# Patient Record
Sex: MALE | Race: WHITE | NOT HISPANIC OR LATINO | Employment: OTHER | URBAN - METROPOLITAN AREA
[De-identification: names, ages, dates, MRNs, and addresses within clinical notes are randomized per-mention and may not be internally consistent; named-entity substitution may affect disease eponyms.]

---

## 2023-05-01 ENCOUNTER — TELEPHONE (OUTPATIENT)
Dept: PRIMARY CARE | Facility: CLINIC | Age: 68
End: 2023-05-01
Payer: MEDICARE

## 2023-05-01 LAB
INR IN PPP BY COAGULATION ASSAY: 4.1 (ref 0.9–1.1)
PROTHROMBIN TIME (PT) IN PPP BY COAGULATION ASSAY: 48.6 SEC (ref 9.8–13.4)

## 2023-05-02 NOTE — TELEPHONE ENCOUNTER
Troy from [facility] is calling to see if we entered the order for skilled nursing and PT for Zeus.

## 2023-05-03 NOTE — TELEPHONE ENCOUNTER
Called and  left message on machine for [facility] [phone] to call.  Per Dr. Parker Abel he would like them to draw patients blood next week.  Order is in the chart.  For a cbc, cmp, lipid, and a hgba1c

## 2023-05-04 ENCOUNTER — TELEPHONE (OUTPATIENT)
Dept: PRIMARY CARE | Facility: CLINIC | Age: 68
End: 2023-05-04
Payer: MEDICARE

## 2023-05-04 NOTE — TELEPHONE ENCOUNTER
Returned a call to gerald and per her request faxed over the lab orders that Dr. Parker Abel requested them draw.  Printed out of the old system and faxed to [phone] per her request

## 2023-05-08 ENCOUNTER — TELEPHONE (OUTPATIENT)
Dept: PRIMARY CARE | Facility: CLINIC | Age: 68
End: 2023-05-08
Payer: MEDICARE

## 2023-05-08 NOTE — TELEPHONE ENCOUNTER
Celeste from [facility] calling with report.    PT eval was done. BP 90/71, Resp 34, HR 65 paced. Dyspnea at rest on 4 liters oxygen. His lasix dose was increased on 5/5.    Education was given to the patient to call the squad if his shortness of breath worsens.     Celeste from [facility] ph: [phone]

## 2023-05-16 ENCOUNTER — APPOINTMENT (OUTPATIENT)
Dept: PRIMARY CARE | Facility: CLINIC | Age: 68
End: 2023-05-16
Payer: MEDICARE

## 2023-05-31 ENCOUNTER — TELEPHONE (OUTPATIENT)
Dept: PRIMARY CARE | Facility: CLINIC | Age: 68
End: 2023-05-31
Payer: MEDICARE

## 2023-05-31 NOTE — TELEPHONE ENCOUNTER
Patient is scheduled for a 6 month FU on 06/06/2023. Does the patient need labs done for that appointment?  I do not see any orders in the system.

## 2023-06-01 DIAGNOSIS — E83.42 HYPOMAGNESEMIA: ICD-10-CM

## 2023-06-01 DIAGNOSIS — E11.22 TYPE 2 DIABETES MELLITUS WITH CHRONIC KIDNEY DISEASE, WITHOUT LONG-TERM CURRENT USE OF INSULIN, UNSPECIFIED CKD STAGE (MULTI): ICD-10-CM

## 2023-06-01 DIAGNOSIS — K21.9 GASTROESOPHAGEAL REFLUX DISEASE WITHOUT ESOPHAGITIS: ICD-10-CM

## 2023-06-01 DIAGNOSIS — E78.00 PURE HYPERCHOLESTEROLEMIA: Primary | ICD-10-CM

## 2023-06-01 PROBLEM — G47.33 OSA ON CPAP: Status: ACTIVE | Noted: 2023-06-01

## 2023-06-01 PROBLEM — E78.5 HYPERLIPIDEMIA: Status: ACTIVE | Noted: 2023-06-01

## 2023-06-01 PROBLEM — R26.2 AMBULATORY DYSFUNCTION: Status: ACTIVE | Noted: 2023-06-01

## 2023-06-01 PROBLEM — N40.0 BPH WITHOUT URINARY OBSTRUCTION: Status: ACTIVE | Noted: 2023-06-01

## 2023-06-01 PROBLEM — I48.0 PAROXYSMAL ATRIAL FIBRILLATION (MULTI): Status: ACTIVE | Noted: 2023-06-01

## 2023-06-01 PROBLEM — I10 HYPERTENSION: Status: ACTIVE | Noted: 2023-06-01

## 2023-06-01 PROBLEM — F32.A ANXIETY AND DEPRESSION: Status: ACTIVE | Noted: 2023-06-01

## 2023-06-01 PROBLEM — E11.9 DIABETES MELLITUS, TYPE II (MULTI): Status: ACTIVE | Noted: 2023-06-01

## 2023-06-01 PROBLEM — N17.9 ACUTE KIDNEY INJURY SUPERIMPOSED ON CHRONIC KIDNEY DISEASE (CMS-HCC): Status: ACTIVE | Noted: 2023-06-01

## 2023-06-01 PROBLEM — G47.61 PERIODIC LIMB MOVEMENTS OF SLEEP: Status: ACTIVE | Noted: 2023-06-01

## 2023-06-01 PROBLEM — F34.1 PRIMARY DYSTHYMIA: Status: ACTIVE | Noted: 2023-06-01

## 2023-06-01 PROBLEM — F41.9 ANXIETY AND DEPRESSION: Status: ACTIVE | Noted: 2023-06-01

## 2023-06-01 PROBLEM — I50.42 CHRONIC COMBINED SYSTOLIC AND DIASTOLIC HEART FAILURE, NYHA CLASS 3 (MULTI): Status: ACTIVE | Noted: 2023-06-01

## 2023-06-01 PROBLEM — N18.9 ACUTE KIDNEY INJURY SUPERIMPOSED ON CHRONIC KIDNEY DISEASE (CMS-HCC): Status: ACTIVE | Noted: 2023-06-01

## 2023-06-01 PROBLEM — J44.9 COPD (CHRONIC OBSTRUCTIVE PULMONARY DISEASE) (MULTI): Status: ACTIVE | Noted: 2023-06-01

## 2023-06-01 PROBLEM — Z95.810 CARDIAC DEFIBRILLATOR IN PLACE: Status: ACTIVE | Noted: 2023-06-01

## 2023-06-01 PROBLEM — J30.9 ALLERGIC RHINITIS: Status: ACTIVE | Noted: 2023-06-01

## 2023-06-01 PROBLEM — I71.21 ANEURYSM OF ASCENDING AORTA (CMS-HCC): Status: ACTIVE | Noted: 2023-06-01

## 2023-06-01 PROBLEM — Z95.2 H/O MECHANICAL AORTIC VALVE REPLACEMENT: Status: ACTIVE | Noted: 2023-06-01

## 2023-06-01 PROBLEM — I44.7 LBBB (LEFT BUNDLE BRANCH BLOCK): Status: ACTIVE | Noted: 2023-06-01

## 2023-06-06 ENCOUNTER — APPOINTMENT (OUTPATIENT)
Dept: PRIMARY CARE | Facility: CLINIC | Age: 68
End: 2023-06-06
Payer: MEDICARE

## 2023-06-06 DIAGNOSIS — E11.9 TYPE 2 DIABETES MELLITUS WITHOUT COMPLICATION, WITHOUT LONG-TERM CURRENT USE OF INSULIN (MULTI): Primary | ICD-10-CM

## 2023-06-06 DIAGNOSIS — E78.00 PURE HYPERCHOLESTEROLEMIA: ICD-10-CM

## 2023-06-06 DIAGNOSIS — F32.A ANXIETY AND DEPRESSION: ICD-10-CM

## 2023-06-06 DIAGNOSIS — K21.9 GASTROESOPHAGEAL REFLUX DISEASE WITHOUT ESOPHAGITIS: ICD-10-CM

## 2023-06-06 DIAGNOSIS — F41.9 ANXIETY AND DEPRESSION: ICD-10-CM

## 2023-06-06 RX ORDER — SERTRALINE HYDROCHLORIDE 50 MG/1
50 TABLET, FILM COATED ORAL DAILY
Qty: 90 TABLET | Refills: 0 | Status: SHIPPED | OUTPATIENT
Start: 2023-06-06 | End: 2024-02-05

## 2023-06-06 RX ORDER — SERTRALINE HYDROCHLORIDE 50 MG/1
50 TABLET, FILM COATED ORAL DAILY
COMMUNITY
End: 2023-06-06 | Stop reason: SDUPTHER

## 2023-06-06 RX ORDER — PANTOPRAZOLE SODIUM 40 MG/1
1 TABLET, DELAYED RELEASE ORAL DAILY
COMMUNITY
Start: 2015-10-26 | End: 2023-06-06 | Stop reason: SDUPTHER

## 2023-06-06 RX ORDER — METFORMIN HYDROCHLORIDE 500 MG/1
500 TABLET ORAL
Qty: 180 TABLET | Refills: 0 | Status: SHIPPED | OUTPATIENT
Start: 2023-06-06 | End: 2023-09-04

## 2023-06-06 RX ORDER — PANTOPRAZOLE SODIUM 40 MG/1
40 TABLET, DELAYED RELEASE ORAL DAILY
Qty: 90 TABLET | Refills: 0 | Status: SHIPPED | OUTPATIENT
Start: 2023-06-06 | End: 2024-02-05

## 2023-06-06 RX ORDER — EZETIMIBE 10 MG/1
10 TABLET ORAL DAILY
Qty: 90 TABLET | Refills: 0 | Status: SHIPPED | OUTPATIENT
Start: 2023-06-06 | End: 2023-09-04

## 2023-06-06 RX ORDER — EZETIMIBE 10 MG/1
10 TABLET ORAL DAILY
COMMUNITY
End: 2023-06-06 | Stop reason: SDUPTHER

## 2023-06-06 RX ORDER — METFORMIN HYDROCHLORIDE 500 MG/1
500 TABLET ORAL
COMMUNITY
End: 2023-06-06 | Stop reason: SDUPTHER

## 2023-06-19 DIAGNOSIS — R53.1 WEAKNESS: Primary | ICD-10-CM

## 2023-07-11 ENCOUNTER — TELEPHONE (OUTPATIENT)
Dept: PRIMARY CARE | Facility: CLINIC | Age: 68
End: 2023-07-11
Payer: MEDICARE

## 2023-07-11 NOTE — TELEPHONE ENCOUNTER
Called and notified Karolyn the Daughter and also called and Left message on machine for Janee from [facility]

## 2023-07-11 NOTE — TELEPHONE ENCOUNTER
Janee from [facility] called in and stated that the patient is declining fast.     The patient is swollen in the upper extremities with a lot of fluid. Lungs are worsening, patient is not able to control his bowels and he is not able to come into the office because he is not mobile.     The patient and his family was offered hospice and this was declined.  Janee did speak with the patient and his wife about palliative care and the family is still thinking about this.  Janee stated that at this rate he maybe has about 2 weeks.

## 2023-07-25 ENCOUNTER — TELEPHONE (OUTPATIENT)
Dept: PRIMARY CARE | Facility: CLINIC | Age: 68
End: 2023-07-25
Payer: MEDICARE

## 2023-07-25 DIAGNOSIS — Q87.89 LOEYS-DIETZ SYNDROME (HHS-HCC): Primary | ICD-10-CM

## 2023-07-25 NOTE — TELEPHONE ENCOUNTER
Celeste, a PT from [facility] the [facility] called in to report that     The patient has no appetite and he is barely eating  The patient is on 4 liters of oxygen and his level is at a 98-99%. Patient wears face mask all the time now  Patient and his wife were told to call the kaye pedraza declined  Hospice services were offered to the patient and that was declined

## 2023-07-26 NOTE — TELEPHONE ENCOUNTER
Janee, a nurse from [facility] called in today and wanted to update you on Zeus.     He is declining rapidly.  He is still refusing ER and hospice  He is still alert but is fully bed ridden and has no energy      Janee mentioned possibly doing DNR paperwork with him, but was not sure how to go about that or if he was even interested in something like that.

## 2023-09-21 PROBLEM — E87.6 HYPOKALEMIA: Status: ACTIVE | Noted: 2023-09-21

## 2023-09-21 PROBLEM — I50.9 CHF EXACERBATION (MULTI): Status: ACTIVE | Noted: 2023-09-21

## 2023-09-21 PROBLEM — R06.02 SHORTNESS OF BREATH AT REST: Status: ACTIVE | Noted: 2023-09-21

## 2023-09-21 PROBLEM — R09.02 HYPOXIA: Status: ACTIVE | Noted: 2023-09-21

## 2023-09-21 PROBLEM — R53.83 FATIGUE: Status: ACTIVE | Noted: 2023-09-21

## 2023-09-21 PROBLEM — I49.3 VENTRICULAR ECTOPY: Status: ACTIVE | Noted: 2023-09-21

## 2023-09-21 PROBLEM — R31.9 HEMATURIA: Status: ACTIVE | Noted: 2023-09-21

## 2023-09-21 PROBLEM — R29.898 COMPLAINTS OF LEG WEAKNESS: Status: ACTIVE | Noted: 2023-09-21

## 2023-09-21 PROBLEM — G72.0 STATIN MYOPATHY: Status: ACTIVE | Noted: 2023-09-21

## 2023-09-21 PROBLEM — R31.29 MICROSCOPIC HEMATURIA: Status: ACTIVE | Noted: 2023-09-21

## 2023-09-21 PROBLEM — F17.200 NICOTINE DEPENDENCE: Status: ACTIVE | Noted: 2023-09-21

## 2023-09-21 PROBLEM — E66.9 OBESITY: Status: ACTIVE | Noted: 2023-09-21

## 2023-09-21 PROBLEM — M79.10 MYALGIA: Status: ACTIVE | Noted: 2023-09-21

## 2023-09-21 PROBLEM — T46.6X5A STATIN MYOPATHY: Status: ACTIVE | Noted: 2023-09-21

## 2023-09-21 RX ORDER — IPRATROPIUM BROMIDE AND ALBUTEROL SULFATE 2.5; .5 MG/3ML; MG/3ML
3 SOLUTION RESPIRATORY (INHALATION) 4 TIMES DAILY
COMMUNITY
Start: 2022-05-20

## 2023-09-21 RX ORDER — EPINEPHRINE 0.22MG
100 AEROSOL WITH ADAPTER (ML) INHALATION
COMMUNITY

## 2023-09-21 RX ORDER — ACETAMINOPHEN 500 MG/1
500 CAPSULE, LIQUID FILLED ORAL EVERY 6 HOURS PRN
COMMUNITY

## 2023-09-21 RX ORDER — ASCORBIC ACID 125 MG
125 TABLET,CHEWABLE ORAL 3 TIMES DAILY
COMMUNITY
Start: 2022-02-15

## 2023-09-21 RX ORDER — WARFARIN 2 MG/1
2 TABLET ORAL
COMMUNITY
Start: 2023-07-24 | End: 2023-11-30 | Stop reason: SDUPTHER

## 2023-09-21 RX ORDER — FLUTICASONE FUROATE, UMECLIDINIUM BROMIDE AND VILANTEROL TRIFENATATE 100; 62.5; 25 UG/1; UG/1; UG/1
1 POWDER RESPIRATORY (INHALATION) DAILY
COMMUNITY
End: 2023-11-30 | Stop reason: SDUPTHER

## 2023-09-21 RX ORDER — TALC
3 POWDER (GRAM) TOPICAL
COMMUNITY
Start: 2021-04-15

## 2023-09-21 RX ORDER — SPIRONOLACTONE 25 MG/1
25 TABLET ORAL DAILY
COMMUNITY
End: 2023-11-13

## 2023-09-21 RX ORDER — QUINIDINE SULFATE 200 MG
1 TABLET ORAL DAILY
COMMUNITY
Start: 2021-09-24

## 2023-09-21 RX ORDER — CARVEDILOL 6.25 MG/1
6.25 TABLET ORAL
COMMUNITY

## 2023-09-21 RX ORDER — TORSEMIDE 20 MG/1
20 TABLET ORAL
COMMUNITY
End: 2023-11-13

## 2023-09-21 RX ORDER — DAPAGLIFLOZIN 10 MG/1
10 TABLET, FILM COATED ORAL
COMMUNITY
End: 2023-11-13

## 2023-09-21 RX ORDER — MAGNESIUM GLUCONATE 27.5 (500)
27.5 TABLET ORAL 2 TIMES DAILY
COMMUNITY
Start: 2019-09-12

## 2023-09-21 RX ORDER — WARFARIN 2.5 MG/1
2.5 TABLET ORAL
COMMUNITY
Start: 2021-08-23 | End: 2023-09-21 | Stop reason: DRUGHIGH

## 2023-09-21 RX ORDER — ALBUTEROL SULFATE 0.83 MG/ML
2.5 SOLUTION RESPIRATORY (INHALATION)
COMMUNITY
Start: 2018-11-05

## 2023-09-21 RX ORDER — FLUTICASONE PROPIONATE 50 MCG
1 SPRAY, SUSPENSION (ML) NASAL 2 TIMES DAILY
COMMUNITY

## 2023-09-21 RX ORDER — SACUBITRIL AND VALSARTAN 24; 26 MG/1; MG/1
1 TABLET, FILM COATED ORAL 2 TIMES DAILY
COMMUNITY
End: 2023-11-13

## 2023-09-21 RX ORDER — CLOTRIMAZOLE AND BETAMETHASONE DIPROPIONATE 10; .64 MG/G; MG/G
CREAM TOPICAL 2 TIMES DAILY
COMMUNITY
Start: 2023-07-14

## 2023-09-21 RX ORDER — LORATADINE 10 MG/1
10 TABLET ORAL DAILY
COMMUNITY
Start: 2022-05-20

## 2023-09-28 LAB
INR IN PPP BY COAGULATION ASSAY EXTERNAL: 4
PROTHROMBIN TIME (PT) IN PPP BY COAGULATION ASSAY EXTERNAL: NORMAL SECONDS

## 2023-10-02 ENCOUNTER — ANTICOAGULATION - WARFARIN VISIT (OUTPATIENT)
Dept: PHARMACY | Facility: HOSPITAL | Age: 68
End: 2023-10-02
Payer: MEDICARE

## 2023-10-02 DIAGNOSIS — I48.0 PAROXYSMAL ATRIAL FIBRILLATION (MULTI): ICD-10-CM

## 2023-10-02 DIAGNOSIS — Z95.2 H/O MECHANICAL AORTIC VALVE REPLACEMENT: Primary | ICD-10-CM

## 2023-10-02 LAB
INR IN PPP BY COAGULATION ASSAY EXTERNAL: 2.6 (ref 2–3)
PROTHROMBIN TIME (PT) IN PPP BY COAGULATION ASSAY EXTERNAL: NORMAL SECONDS

## 2023-10-02 NOTE — PATIENT INSTRUCTIONS
Your INR is in range today at 2.6. Please continue on your weekly regimen of 4mg Mon, Wed, Fri, Sat, and 2mg all other days. We will follow up next Monday.  If any questions arise do not hesitate to call us at [phone] M-F from 8:30am-4:30pm or at [phone] after 5pm or on the weekends. Continue to monitor for any excess bleeding or bruising, especially for blood in your stool.

## 2023-10-02 NOTE — PROGRESS NOTES
I received a fax from [employer] on 10/2/23 with an INR of 2.6. Mrs. Oliveira reports there was no changes in medications. She stated that his appetite is back to normal. No signs or symptoms of bleeding. Given his INR is back in range after a 2mg hold, we will continue him on 2mg Sun, Tues, Thurs and 4mg all other days. We will follow up next Monday.

## 2023-10-09 ENCOUNTER — ANTICOAGULATION - WARFARIN VISIT (OUTPATIENT)
Dept: PHARMACY | Facility: HOSPITAL | Age: 68
End: 2023-10-09
Payer: MEDICARE

## 2023-10-09 DIAGNOSIS — I48.0 PAROXYSMAL ATRIAL FIBRILLATION (MULTI): Primary | ICD-10-CM

## 2023-10-09 LAB
INR IN PPP BY COAGULATION ASSAY EXTERNAL: 3.3 (ref 2.5–3.5)
PROTHROMBIN TIME (PT) IN PPP BY COAGULATION ASSAY EXTERNAL: NORMAL SECONDS

## 2023-10-09 NOTE — PATIENT INSTRUCTIONS
Your INR is in range today at 3.3. Please continue on your weekly regimen of 4mg Mon, Wed, Fri, Sat, and 2mg all other days. We will follow up next Monday.  If any questions arise do not hesitate to call us at [phone] M-F from 8:30am-4:30pm or at [phone] after 5pm or on the weekends. Continue to monitor for any excess bleeding or bruising, especially for blood in your stool.

## 2023-10-09 NOTE — PROGRESS NOTES
I received a fax from [employer] on 10/9/23 with an INR of 3.3. Mrs. Oliveira reports there was no changes in medications. She stated that his appetite is normal. No signs or symptoms of bleeding. Given his INR is in range, we will continue him on 2mg Sun, Tues, Thurs and 4mg all other days. We will follow up next Monday. We discussed with Mrs. Oliveira to ensure he is getting some greens in his diet weekly.

## 2023-10-16 ENCOUNTER — ANTICOAGULATION - WARFARIN VISIT (OUTPATIENT)
Dept: PHARMACY | Facility: HOSPITAL | Age: 68
End: 2023-10-16
Payer: MEDICARE

## 2023-10-16 DIAGNOSIS — Z95.2 H/O MECHANICAL AORTIC VALVE REPLACEMENT: ICD-10-CM

## 2023-10-16 DIAGNOSIS — I48.0 PAROXYSMAL ATRIAL FIBRILLATION (MULTI): Primary | ICD-10-CM

## 2023-10-16 LAB
INR IN PPP BY COAGULATION ASSAY EXTERNAL: 4.8 (ref 2.5–3.5)
PROTHROMBIN TIME (PT) IN PPP BY COAGULATION ASSAY EXTERNAL: ABNORMAL SECONDS

## 2023-10-16 NOTE — PATIENT INSTRUCTIONS
Your INR is high today at 4.8. Please hold today and then continue on your weekly regimen of 4mg Mon, Wed, Fri, Sat, and 2mg all other days. Please eat your greens the next two days. We will follow up next Monday.  If any questions arise do not hesitate to call us at [phone] M-F from 8:30am-4:30pm or at [phone] after 5pm or on the weekends. Continue to monitor for any excess bleeding or bruising, especially for blood in your stool.

## 2023-10-16 NOTE — PROGRESS NOTES
I received a fax from [employer] on 10/16/23 with an INR of 4.8. Mrs. Oliveira reports there was no changes in medications. She stated that his appetite is less than normal. He had his flu shot last week so he wasn't feeling too good. She mentioned he was still eating but not as much as normal. He also had no greens last week when normally he has green beans or broccoli.  No signs or symptoms of bleeding. Given his INR is high today, we will hold today, and then continue him on 2mg Sun, Tues, Thurs and 4mg all other days. I discussed with Mrs. Oliveira to ensure he gets some of those dark greens in tonight and tomorrow. She is to call if he does not and if his appetite does not go back to normal. We will follow up next Monday.

## 2023-10-17 ENCOUNTER — HOSPITAL ENCOUNTER (OUTPATIENT)
Dept: CARDIOLOGY | Facility: HOSPITAL | Age: 68
Discharge: HOME | End: 2023-10-17
Payer: MEDICARE

## 2023-10-17 DIAGNOSIS — I50.22 CHRONIC SYSTOLIC (CONGESTIVE) HEART FAILURE (MULTI): ICD-10-CM

## 2023-10-17 DIAGNOSIS — Z95.810 PRESENCE OF AUTOMATIC (IMPLANTABLE) CARDIAC DEFIBRILLATOR: ICD-10-CM

## 2023-10-17 DIAGNOSIS — I42.9 CARDIOMYOPATHY, UNSPECIFIED (MULTI): ICD-10-CM

## 2023-10-17 DIAGNOSIS — Z95.810 PRESENCE OF AUTOMATIC (IMPLANTABLE) CARDIAC DEFIBRILLATOR: Primary | ICD-10-CM

## 2023-10-17 PROCEDURE — 93296 REM INTERROG EVL PM/IDS: CPT

## 2023-10-23 ENCOUNTER — ANTICOAGULATION - WARFARIN VISIT (OUTPATIENT)
Dept: PHARMACY | Facility: HOSPITAL | Age: 68
End: 2023-10-23
Payer: MEDICARE

## 2023-10-23 DIAGNOSIS — Z95.2 H/O MECHANICAL AORTIC VALVE REPLACEMENT: ICD-10-CM

## 2023-10-23 DIAGNOSIS — I48.0 PAROXYSMAL ATRIAL FIBRILLATION (MULTI): Primary | ICD-10-CM

## 2023-10-23 LAB
INR IN PPP BY COAGULATION ASSAY EXTERNAL: 3.3 (ref 2.5–3.5)
PROTHROMBIN TIME (PT) IN PPP BY COAGULATION ASSAY EXTERNAL: ABNORMAL SECONDS

## 2023-10-23 NOTE — PATIENT INSTRUCTIONS
Your INR is in range at 3.3. Please continue on your weekly regimen of 4mg Mon, Wed, Fri, Sat, and 2mg all other days. We will follow up next Monday.  If any questions arise do not hesitate to call us at [phone] M-F from 8:30am-4:30pm or at [phone] after 5pm or on the weekends. Continue to monitor for any excess bleeding or bruising, especially for blood in your stool.

## 2023-10-23 NOTE — PROGRESS NOTES
I received a fax from [employer] on 10/23/23 with an INR of 3.3. Mrs. Oliveira reports there was no changes in medications. She stated that his appetite is back to normal. He had his flu shot last week so he wasn't feeling too good. She mentioned he is better now. He had his greens last week. No signs or symptoms of bleeding. Given his INR is back in range today, we will continue him on 2mg Sun, Tues, Thurs and 4mg all other days. I discussed with Mrs. Oliveira to ensure he gets some of those dark greens weekly. We will follow up next Monday.

## 2023-10-26 DIAGNOSIS — I50.42 CHRONIC COMBINED SYSTOLIC AND DIASTOLIC HEART FAILURE, NYHA CLASS 3 (MULTI): Primary | ICD-10-CM

## 2023-10-26 PROBLEM — N18.32 STAGE 3B CHRONIC KIDNEY DISEASE (MULTI): Status: ACTIVE | Noted: 2023-10-26

## 2023-10-26 NOTE — PROGRESS NOTES
[employer] shows fluid index is elevated since July.  Wife does not feel he is having any issues with congestion. He is in process of establishing with visiting physicians.  He is basically bed ridden per his wife's report.  BMP/BNP in 1 week.  He has VNS support but so far has refused Hospice care.  Fax requisition to [facility]  [phone].

## 2023-10-30 ENCOUNTER — ANTICOAGULATION - WARFARIN VISIT (OUTPATIENT)
Dept: PHARMACY | Facility: HOSPITAL | Age: 68
End: 2023-10-30
Payer: MEDICARE

## 2023-10-30 LAB
INR IN PPP BY COAGULATION ASSAY EXTERNAL: 4.4 (ref 2.5–3.5)
PROTHROMBIN TIME (PT) IN PPP BY COAGULATION ASSAY EXTERNAL: ABNORMAL SECONDS

## 2023-10-30 NOTE — PROGRESS NOTES
I received a fax from [employer] on 10/30/23 with an INR of 4.4. Mrs. Oliveira reports there was no changes in medications. She stated that his appetite was poor the past week but improving slowly. No signs or symptoms of bleeding. Given his INR is elevated at 4.4, we will hold today and reduce his Friday dose to 2mg temporarily instead of 4mg. Then we continue his weekly regimen to 4 mg on Mon, Wed, Sat and 2 mg all other days. I discussed with Mrs. Oliveira to ensure he gets some of those dark greens weekly. We will follow up next Monday.

## 2023-10-30 NOTE — PATIENT INSTRUCTIONS
Your INR is high at 4.4. Please hold today and we will have you take 2mg on Friday instead of 4mg. Then continue your weekly regimen of 4mg Mon, Wed, Sat, and 2mg all other days. We will follow up next Monday.  If any questions arise do not hesitate to call us at [phone] M-F from 8:30am-4:30pm or at [phone] after 5pm or on the weekends. Continue to monitor for any excess bleeding or bruising, especially for blood in your stool.

## 2023-11-06 ENCOUNTER — TELEPHONE (OUTPATIENT)
Dept: PRIMARY CARE | Facility: CLINIC | Age: 68
End: 2023-11-06
Payer: MEDICARE

## 2023-11-06 ENCOUNTER — ANTICOAGULATION - WARFARIN VISIT (OUTPATIENT)
Dept: PHARMACY | Facility: HOSPITAL | Age: 68
End: 2023-11-06
Payer: MEDICARE

## 2023-11-06 DIAGNOSIS — Z95.2 H/O MECHANICAL AORTIC VALVE REPLACEMENT: ICD-10-CM

## 2023-11-06 DIAGNOSIS — I48.0 PAROXYSMAL ATRIAL FIBRILLATION (MULTI): Primary | ICD-10-CM

## 2023-11-06 LAB
INR IN PPP BY COAGULATION ASSAY EXTERNAL: 3.2 (ref 2.5–3.5)
PROTHROMBIN TIME (PT) IN PPP BY COAGULATION ASSAY EXTERNAL: NORMAL SECONDS

## 2023-11-06 NOTE — PROGRESS NOTES
I received a fax from [facility] on 11/6/23 with an INR of 3.2. Mrs. Oliveira reports there was no changes in medications. She stated that he has not been eating well recently because he has not been feeling well. No signs or symptoms of bleeding. Given his INR is in range at 3.2, we will take a reduced dose today of 2 mg and then reduce continue his weekly regimen to 4 mg on Mon, Wed, Fri and 2 mg all other days. I discussed with Mrs. Oliveira to ensure he gets some of those dark greens weekly. We will follow up next Monday.

## 2023-11-06 NOTE — PATIENT INSTRUCTIONS
Your INR is in range at 3.2. Please take a reduced dose of 2 mg today only then we will reduce your weekly dose to 4 mg on Mon, Wed, Fri and 2 mg all other days. We will follow up next Monday.  If any questions arise do not hesitate to call us at [phone] M-F from 8:30am-4:30pm or at [phone] after 5pm or on the weekends. Continue to monitor for any excess bleeding or bruising, especially for blood in your stool.

## 2023-11-06 NOTE — TELEPHONE ENCOUNTER
Rx Refill Request Telephone Encounter    Name:  Zeus Oliveira  :  [id number]  Medication Name:      sertraline (Zoloft) 50 mg tablet       ezetimibe (Zetia) 10 mg tablet             Specific Pharmacy location:   [facility], [place] - [address]         Date of last appointment: 22   Date of next appointment:    Best number to reach patient: [phone]

## 2023-11-11 ENCOUNTER — LAB (OUTPATIENT)
Dept: LAB | Facility: LAB | Age: 68
End: 2023-11-11
Payer: MEDICARE

## 2023-11-11 DIAGNOSIS — N18.32 CHRONIC KIDNEY DISEASE, STAGE 3B (MULTI): Primary | ICD-10-CM

## 2023-11-11 LAB
APPEARANCE UR: ABNORMAL
BACTERIA #/AREA URNS AUTO: ABNORMAL /HPF
BILIRUB UR STRIP.AUTO-MCNC: NEGATIVE MG/DL
COLOR UR: YELLOW
CREAT UR-MCNC: 52.4 MG/DL (ref 20–370)
GLUCOSE UR STRIP.AUTO-MCNC: NEGATIVE MG/DL
KETONES UR STRIP.AUTO-MCNC: NEGATIVE MG/DL
LEUKOCYTE ESTERASE UR QL STRIP.AUTO: ABNORMAL
MICROALBUMIN UR-MCNC: 43.2 MG/L
MICROALBUMIN/CREAT UR: 82.4 UG/MG CREAT
MUCOUS THREADS #/AREA URNS AUTO: ABNORMAL /LPF
NITRITE UR QL STRIP.AUTO: NEGATIVE
PH UR STRIP.AUTO: 5 [PH]
PROT UR STRIP.AUTO-MCNC: NEGATIVE MG/DL
RBC # UR STRIP.AUTO: ABNORMAL /UL
RBC #/AREA URNS AUTO: ABNORMAL /HPF
SP GR UR STRIP.AUTO: 1.01
UROBILINOGEN UR STRIP.AUTO-MCNC: <2 MG/DL
WBC #/AREA URNS AUTO: ABNORMAL /HPF

## 2023-11-11 PROCEDURE — 81001 URINALYSIS AUTO W/SCOPE: CPT

## 2023-11-11 PROCEDURE — 82043 UR ALBUMIN QUANTITATIVE: CPT

## 2023-11-11 PROCEDURE — 82570 ASSAY OF URINE CREATININE: CPT

## 2023-11-13 ENCOUNTER — DOCUMENTATION (OUTPATIENT)
Dept: CARDIOLOGY | Facility: HOSPITAL | Age: 68
End: 2023-11-13
Payer: MEDICARE

## 2023-11-13 ENCOUNTER — ANTICOAGULATION - WARFARIN VISIT (OUTPATIENT)
Dept: PHARMACY | Facility: HOSPITAL | Age: 68
End: 2023-11-13
Payer: MEDICARE

## 2023-11-13 DIAGNOSIS — I48.0 PAROXYSMAL ATRIAL FIBRILLATION (MULTI): Primary | ICD-10-CM

## 2023-11-13 DIAGNOSIS — N18.9 ACUTE ON CHRONIC RENAL INSUFFICIENCY: Primary | ICD-10-CM

## 2023-11-13 DIAGNOSIS — N28.9 ACUTE ON CHRONIC RENAL INSUFFICIENCY: Primary | ICD-10-CM

## 2023-11-13 DIAGNOSIS — I50.22 CHRONIC SYSTOLIC HEART FAILURE (MULTI): ICD-10-CM

## 2023-11-13 DIAGNOSIS — Z95.2 H/O MECHANICAL AORTIC VALVE REPLACEMENT: ICD-10-CM

## 2023-11-13 DIAGNOSIS — Z95.810 CARDIAC DEFIBRILLATOR IN PLACE: ICD-10-CM

## 2023-11-13 DIAGNOSIS — I50.42 CHRONIC COMBINED SYSTOLIC AND DIASTOLIC HEART FAILURE, NYHA CLASS 3 (MULTI): Primary | ICD-10-CM

## 2023-11-13 LAB
INR IN PPP BY COAGULATION ASSAY EXTERNAL: 2.9 (ref 2.5–3.5)
PROTHROMBIN TIME (PT) IN PPP BY COAGULATION ASSAY EXTERNAL: NORMAL SECONDS

## 2023-11-13 NOTE — PROGRESS NOTES
Signed  Creation Time: 11/13/2023  5:42 PM  Tamela Casas, APRN-CNP     Labs from [facility] group reviewed.   Potassium is 5.7  BUN/CR 96/6.5.  (Last labs in August 2023 showed BUN/CR 39/1.89. )  BNP is greater than 4000.   He has hx of severe COPD and uses O2 and CPAP at home.   His last Echocardiogram 12/29/22 showed EF of 20%.  He has CRT-D in place.  He was evaluated by advanced HF and felt not to be a candidate for advanced therapies due to the significant respiratory impairment.   Patient has been non ambulatory at home since May this year.  He has had failure to thrive.  He has indwelling cardenas catheter at home.   He has been unable to come in for appointment and has been followed by [facility].   He has also had VNS and in home PT without improvement.  Hospice has been discussed as well as DNR status previously but to this point patient has refused.  He has refused so far to come to the ER for evaluation per daughter's report today.      I discussed all of above findings with daughter and instructed her to stop the Entresto, Farxiga, Torsemide and spironolactone in light of the acute renal failure.   I have advised that she call the squad and have him transported to the hospital for evaluation and treatment.   She is not sure that he will be agreeable to do that.   We also discussed that reconsideration can be given toward moving to palliative/ hospice care at this time.  Patient  would have to be willing to sign DNR status in able to engage hospice.  Also would need to have defibillator portion of the CRT-D deactivated.  Prognosis is poor in either case due to ongoing decline in overall health over the last several months, severe cardiomyopathy, severe COPD and now acute renal failure.     Daughter agrees to keep us updated concerning patient's decisions.  Advised again transport to ER given the acute renal insufficiency.   She verbalizes understanding of instructions and is  agreeable.      I call [facility] group and was unable to personally talk with anyone there.  I left message concerning above instructions and medication adjustments on their answering service.       Tamela Casas MSN, APRN, BC, CNP

## 2023-11-13 NOTE — PATIENT INSTRUCTIONS
Your INR is in range at 2.9. Please continue your weekly regimen of 4 mg on Mon, Wed, Fri and 2 mg all other days. We will follow up next Monday.  If any questions arise do not hesitate to call us at [phone] M-F from 8:30am-4:30pm or at [phone] after 5pm or on the weekends. Continue to monitor for any excess bleeding or bruising, especially for blood in your stool.

## 2023-11-13 NOTE — PROGRESS NOTES
Labs from [facility] group reviewed.   Potassium is 5.7  BUN/CR 96/6.5.  (Last labs in August 2023 showed BUN/CR 39/1.89. )  BNP is greater than 4000.   He has hx of severe COPD and uses O2 and CPAP at home.   His last Echocardiogram 12/29/22 showed EF of 20%.  He has CRT-D in place.  He was evaluated by advanced HF and felt not to be a candidate for advanced therapies due to the significant respiratory impairment.   Patient has been non ambulatory at home since May this year.  He has had failure to thrive.  He has indwelling cardenas catheter at home.   He has been unable to come in for appointment and has been followed by [facility].   He has also had VNS and in home PT without improvement.  Hospice has been discussed as well as DNR status previously but to this point patient has refused.  He has refused so far to come to the ER for evaluation per daughter's report today.     I discussed all of above findings with daughter and instructed her to stop the Entresto, Farxiga, Torsemide and spironolactone in light of the acute renal failure.   I have advised that she call the squad and have him transported to the hospital for evaluation and treatment.   She is not sure that he will be agreeable to do that.   We also discussed that reconsideration can be given toward moving to palliative/ hospice care at this time.  Patient  would have to be willing to sign DNR status in able to engage hospice.  Also would need to have defibillator portion of the CRT-D deactivated.  Prognosis is poor in either case due to ongoing decline in overall health over the last several months, severe cardiomyopathy, severe COPD and now acute renal failure.    Daughter agrees to keep us updated concerning patient's decisions.  Advised again transport to ER given the acute renal insufficiency.   She verbalizes understanding of instructions and is agreeable.     I call [facility] and was unable to personally talk with  anyone there.  I left message concerning above instructions and medication adjustments on their answering service.      Tamela Casas MSN, APRN, BC, CNP

## 2023-11-13 NOTE — PROGRESS NOTES
I received a fax from [employer] on 11/13/23 with an INR of 2.9. Mrs. Oliveira reports there were medications removed from his list such as potassium, metformin, spironolactone, Farxiga and torsemide due to progressing into kidney failure. She stated that his appetite has not improved due to not feeling well. No signs or symptoms of bleeding. Given his INR is in range at 2.9, we will continue his weekly regimen of 4 mg on Mon, Wed, Fri and 2 mg all other days. We will follow up next Monday.

## 2023-11-13 NOTE — PROGRESS NOTES
Labs from [facility] group reviewed.   Potassium is 5.7  BUN/CR 96/6.5.  (Last labs in August 2023 showed BUN/CR 39/1.89. )  BNP is greater than 4000.   He has hx of severe COPD and uses O2 and CPAP at home.   His last Echocardiogram 12/29/22 showed EF of 20%.  He has CRT-D in place.  He was evaluated by advanced HF and felt not to be a candidate for advanced therapies due to the significant respiratory impairment.   Patient has been non ambulatory at home since May this year.  He has had failure to thrive.  He has indwelling cardenas catheter at home.   He has been unable to come in for appointment and has been followed by [facility].   He has also had VNS and in home PT without improvement.  Hospice has been discussed as well as DNR status previously but to this point patient has refused.  He has refused so far to come to the ER for evaluation per daughter's report today.      I discussed all of above findings with daughter and instructed her to stop the Entresto, Farxiga, Torsemide and spironolactone in light of the acute renal failure.   I have advised that she call the squad and have him transported to the hospital for evaluation and treatment.   She is not sure that he will be agreeable to do that.   We also discussed that reconsideration can be given toward moving to palliative/ hospice care at this time.  Patient  would have to be willing to sign DNR status in able to engage hospice.  Also would need to have defibillator portion of the CRT-D deactivated.  Prognosis is poor in either case due to ongoing decline in overall health over the last several months, severe cardiomyopathy, severe COPD and now acute renal failure.     Daughter agrees to keep us updated concerning patient's decisions.  Advised again transport to ER given the acute renal insufficiency.   She verbalizes understanding of instructions and is agreeable.      I call [facility] and was unable to personally talk with  anyone there.  I left message concerning above instructions and medication adjustments on their answering service.       Tamela Casas MSN, APRN, BC, CNP

## 2023-11-15 ENCOUNTER — TELEPHONE (OUTPATIENT)
Dept: CARDIOLOGY | Facility: HOSPITAL | Age: 68
End: 2023-11-15

## 2023-11-16 DIAGNOSIS — I48.0 PAROXYSMAL ATRIAL FIBRILLATION (MULTI): ICD-10-CM

## 2023-11-16 DIAGNOSIS — I50.42 CHRONIC COMBINED SYSTOLIC AND DIASTOLIC HEART FAILURE, NYHA CLASS 3 (MULTI): Primary | ICD-10-CM

## 2023-11-16 NOTE — PROGRESS NOTES
Received call from daughter  today asking if his defibrillator could be turned off to avoid shocks. Patient is bed bound at home and unable to come in to clinic to have this completed.   He is likely going to hospice.   Visiting physicians are going to go out tomorrow and write DNR orders.  I have messaged [facility] to find out how we can get a [employer] rep out to his house to deactivate the defibrillator portion of the device per patient and family requests.

## 2023-11-17 ENCOUNTER — DOCUMENTATION (OUTPATIENT)
Dept: CARDIOLOGY | Facility: HOSPITAL | Age: 68
End: 2023-11-17
Payer: MEDICARE

## 2023-11-17 NOTE — PROGRESS NOTES
Spoke with Deyvi Torres from [employer].   Discussed patient case and that patient and family are requesting defibrillator capability of the device be turned off due to declining health status and likely patient will be going to Hospice soon.   He states he will go out to patient house today to turn off the defibrillator portion of the device.

## 2023-11-20 ENCOUNTER — ANTICOAGULATION - WARFARIN VISIT (OUTPATIENT)
Dept: PHARMACY | Facility: HOSPITAL | Age: 68
End: 2023-11-20
Payer: MEDICARE

## 2023-11-20 DIAGNOSIS — Z95.2 H/O MECHANICAL AORTIC VALVE REPLACEMENT: ICD-10-CM

## 2023-11-20 DIAGNOSIS — I48.0 PAROXYSMAL ATRIAL FIBRILLATION (MULTI): Primary | ICD-10-CM

## 2023-11-20 LAB
INR IN PPP BY COAGULATION ASSAY EXTERNAL: 5.2 (ref 2.5–3.5)
PROTHROMBIN TIME (PT) IN PPP BY COAGULATION ASSAY EXTERNAL: ABNORMAL SECONDS

## 2023-11-20 NOTE — PATIENT INSTRUCTIONS
Your INR is high at 5.2. Please hold today, take 1mg tomorrow and recheck INR on Wednesday.    If any questions arise do not hesitate to call us at [phone] M-F from 8:30am-4:30pm or at [phone] after 5pm or on the weekends. Continue to monitor for any excess bleeding or bruising, especially for blood in your stool.

## 2023-11-20 NOTE — PROGRESS NOTES
I received a fax from [employer] on 11/20/2023 with an INR of 5.2. Mrs. Oliveira reports there were medications removed from his list such as potassium, metformin, spironolactone, Farxiga and torsemide due to progressing into kidney failure two weeks ago. He was started on buspar. She stated his appetite has been okay these past few days but yesterday was very poor. He did not eat anything yesterday. She also stated his defibrillator was turned off last week. She said she was going to try and get him to eat greens today or at least drink some green tea. He had no greens in his diet last week which was not normal. No signs or symptoms of bleeding. Given his INR is high today at 5.2, we will hold today, take 1mg tomorrow and retest on Wednesday. I told Mrs. Oliveira call us tomorrow to see how his appetite is to see if we need to make any adjustments at that point in time.

## 2023-11-22 ENCOUNTER — ANTICOAGULATION - WARFARIN VISIT (OUTPATIENT)
Dept: PHARMACY | Facility: HOSPITAL | Age: 68
End: 2023-11-22
Payer: MEDICARE

## 2023-11-22 DIAGNOSIS — Z95.2 H/O MECHANICAL AORTIC VALVE REPLACEMENT: ICD-10-CM

## 2023-11-22 DIAGNOSIS — I48.0 PAROXYSMAL ATRIAL FIBRILLATION (MULTI): Primary | ICD-10-CM

## 2023-11-22 LAB
INR IN PPP BY COAGULATION ASSAY EXTERNAL: 4.3 (ref 2.5–3.5)
PROTHROMBIN TIME (PT) IN PPP BY COAGULATION ASSAY EXTERNAL: ABNORMAL SECONDS

## 2023-11-22 NOTE — PROGRESS NOTES
I received a fax from [employer] on 11/22/2023 with an INR of 4.3. Mrs. Oliveira reports there were medications removed from his list such as potassium, metformin, spironolactone, Farxiga and torsemide due to progressing into kidney failure two weeks ago. He was started on buspar. She stated she held the warfarin for 2 days. Today his INR is 4.3. Mrs. Oliveira stated he is eating better these past two days and actually consuming food rather than just fluids. We discussed to keep an eye on his appetite and to call us if it becomes poor again. Since we held for 2 days we will have him take 1mg tonight and then continue his weekly regimen of 4 mg on Mon, Wed, Fri and 2 mg all other days. We discussed if he is not eating then we would give him a reduced Friday dose. She is to keep up updated if anything changes. We will follow up on Monday.

## 2023-11-22 NOTE — PATIENT INSTRUCTIONS
Your INR is high at 4.3. Please take 1mg tomorrow and then continue his weekly regimen of recheck INR on Monday.  If his appetite becomes poor again please call the clinic.  If any questions arise do not hesitate to call us at [phone] M-F from 8:30am-4:30pm or at [phone] after 5pm or on the weekends. Continue to monitor for any excess bleeding or bruising, especially for blood in your stool.

## 2023-11-27 ENCOUNTER — ANTICOAGULATION - WARFARIN VISIT (OUTPATIENT)
Dept: PHARMACY | Facility: HOSPITAL | Age: 68
End: 2023-11-27
Payer: MEDICARE

## 2023-11-27 DIAGNOSIS — I48.0 PAROXYSMAL ATRIAL FIBRILLATION (MULTI): Primary | ICD-10-CM

## 2023-11-27 DIAGNOSIS — Z95.2 H/O MECHANICAL AORTIC VALVE REPLACEMENT: ICD-10-CM

## 2023-11-27 LAB
INR IN PPP BY COAGULATION ASSAY EXTERNAL: 6.2 (ref 2.5–3.5)
PROTHROMBIN TIME (PT) IN PPP BY COAGULATION ASSAY EXTERNAL: ABNORMAL SECONDS

## 2023-11-27 NOTE — PATIENT INSTRUCTIONS
Your INR is high at 6.2. Please hold the next two days. We will retest on Wednesday. Please eat broccoli the next two days. If appetite significantly changes please call the clinic.   If any questions arise do not hesitate to call us at [phone] M-F from 8:30am-4:30pm or at [phone] after 5pm or on the weekends. Continue to monitor for any excess bleeding or bruising, especially for blood in your stool.

## 2023-11-27 NOTE — PROGRESS NOTES
I received a fax from [employer] on 11/27/2023 with an INR of 6.2. I spoke with Mrs. Oliveira and she stated Mr. Oliveira has been eating very poorly these past few days. She did state she took his INR on Friday and it was 3.2. Today his INR has increased to 6.2 after her giving him 2mg the past three days. She stated he does drink his green tea but he really is not eating full meals. He only eats at most a meal a day. No signs or symptoms of bleeding. She stated he ate really well on Thanksgiving but since then has not. We discussed given how appetite has significantly declined that his previous regimen of warfarin is too much. She was going to try to get him to eat broccoli today. Given INR is high, we will have him hold the warfarin today and tomorrow. She is to retest him on Wednesday and call us if appetite significantly changes.

## 2023-11-30 ENCOUNTER — ANTICOAGULATION - WARFARIN VISIT (OUTPATIENT)
Dept: PHARMACY | Facility: HOSPITAL | Age: 68
End: 2023-11-30
Payer: MEDICARE

## 2023-11-30 DIAGNOSIS — Z95.2 H/O MECHANICAL AORTIC VALVE REPLACEMENT: ICD-10-CM

## 2023-11-30 DIAGNOSIS — I48.0 PAROXYSMAL ATRIAL FIBRILLATION (MULTI): Primary | ICD-10-CM

## 2023-11-30 DIAGNOSIS — J44.9 CHRONIC OBSTRUCTIVE PULMONARY DISEASE, UNSPECIFIED COPD TYPE (MULTI): Primary | ICD-10-CM

## 2023-11-30 LAB
INR IN PPP BY COAGULATION ASSAY EXTERNAL: 1.5 (ref 2.5–3.5)
PROTHROMBIN TIME (PT) IN PPP BY COAGULATION ASSAY EXTERNAL: ABNORMAL SECONDS

## 2023-11-30 RX ORDER — WARFARIN 2 MG/1
TABLET ORAL
Qty: 40 TABLET | Refills: 2 | Status: SHIPPED | OUTPATIENT
Start: 2023-11-30 | End: 2024-01-08

## 2023-11-30 RX ORDER — FLUTICASONE FUROATE, UMECLIDINIUM BROMIDE AND VILANTEROL TRIFENATATE 100; 62.5; 25 UG/1; UG/1; UG/1
1 POWDER RESPIRATORY (INHALATION) DAILY
Qty: 3 EACH | Refills: 3 | Status: SHIPPED | OUTPATIENT
Start: 2023-11-30 | End: 2024-03-05

## 2023-11-30 NOTE — PATIENT INSTRUCTIONS
Your INR is low at 1.5. Please take 6mg tonight. We will start lovenox twice a day. We will restest tomorrow.  If any questions arise do not hesitate to call us at [phone] M-F from 8:30am-4:30pm or at [phone] after 5pm or on the weekends. Continue to monitor for any excess bleeding or bruising, especially for blood in your stool.

## 2023-11-30 NOTE — PROGRESS NOTES
I received a fax from [employer] on 11/30/2023 with an INR of 1.5. I spoke with Mrs. Oliveira and she stated Mr. Oliveira has been eating very poorly these past few days. He was put on Bactrim and has two days left. No signs or symptoms of bleeding. Mr. Oliveira has been drinking his green teas and other fluids but has been eating. Mrs. Oliveira stated he eats very little and not full meals. She held the warfarin for 3 days due to his INR going up to 6.3 on Monday after continuing his home regimen and his INR being 3.2 on Friday. Today his INR has dropped to 1.5 despite being on the Bactrim. Given his INR is subtherapeutic, we will boost today with 6mg and bridge with lovenox injections twice daily. His wife discussed with me he really does not want to be doing injections. We discussed the risk of clotting or having a stroke.  She stated she was going to try to get him to at least do the injections today. I discussed with her if he does not to let me know so we can let Dr. Jackson's office know. We will follow up tomorrow to see what his INR is.

## 2023-12-01 ENCOUNTER — ANTICOAGULATION - WARFARIN VISIT (OUTPATIENT)
Dept: PHARMACY | Facility: HOSPITAL | Age: 68
End: 2023-12-01
Payer: MEDICARE

## 2023-12-01 DIAGNOSIS — Z95.2 H/O MECHANICAL AORTIC VALVE REPLACEMENT: ICD-10-CM

## 2023-12-01 DIAGNOSIS — I48.0 PAROXYSMAL ATRIAL FIBRILLATION (MULTI): Primary | ICD-10-CM

## 2023-12-01 LAB
INR IN PPP BY COAGULATION ASSAY EXTERNAL: 2.1 (ref 2.5–3.5)
PROTHROMBIN TIME (PT) IN PPP BY COAGULATION ASSAY EXTERNAL: ABNORMAL SECONDS

## 2023-12-01 NOTE — PATIENT INSTRUCTIONS
Your INR is low at 2.1. We will reduce your weekly regimen to 2mg daily. Please stop the lovenox injections. We will follow up on Monday.  If any questions arise do not hesitate to call us at [phone] M-F from 8:30am-4:30pm or at [phone] after 5pm or on the weekends. Continue to monitor for any excess bleeding or bruising, especially for blood in your stool.

## 2023-12-01 NOTE — PROGRESS NOTES
I received a fax from [employer] on 12/1/2023 with an INR of 2.1. I spoke with Mrs. Oliveira and she stated Mr. Oliveira has been eating very poorly these past few days. He was put on Bactrim and has one day left. No signs or symptoms of bleeding. Mr. Oliveira has been drinking his green teas and other fluids but has not been eating. Mrs. Oliveira stated he eats very little and not full meals. She held the warfarin for 3 days due to his INR going up to 6.3 on Monday after continuing his home regimen and his INR being 3.2 on Friday. Yesterday his INR has dropped to 1.5 despite being on the Bactrim. We had started lovenox injections and gave 6mg yesterday. Given INR is 2.1, we will stop the lovenox injections and start him on 2mg daily. We will follow up on Monday.

## 2023-12-04 ENCOUNTER — ANTICOAGULATION - WARFARIN VISIT (OUTPATIENT)
Dept: PHARMACY | Facility: HOSPITAL | Age: 68
End: 2023-12-04
Payer: MEDICARE

## 2023-12-04 DIAGNOSIS — I48.0 PAROXYSMAL ATRIAL FIBRILLATION (MULTI): Primary | ICD-10-CM

## 2023-12-04 DIAGNOSIS — Z95.2 H/O MECHANICAL AORTIC VALVE REPLACEMENT: ICD-10-CM

## 2023-12-04 LAB
INR IN PPP BY COAGULATION ASSAY EXTERNAL: 3.9 (ref 2.5–3.5)
PROTHROMBIN TIME (PT) IN PPP BY COAGULATION ASSAY EXTERNAL: ABNORMAL SECONDS

## 2023-12-04 NOTE — PROGRESS NOTES
I received a fax from [employer] on 12/4/2023 with an INR of 3.9. I spoke with Mrs. Oliveira and she stated Mr. Oliveira has been eating very poorly these past few days. At this point he is not eating much except a spoonful or two. She stated his breathing has become worse and he needs the oxygen mask. We discussed how he has refused hospice. She stated for now he would like to continue the warfarin. No signs or symptoms of bleeding. He does have a small bruise on his hand. Given his INR is slightly elevated and eating is almost non-existent we will hold the warfarin today. She will give him 2mg daily and we will recheck the INR on Thursday.

## 2023-12-04 NOTE — PATIENT INSTRUCTIONS
Your INR is elevated today at 3.9. Please hold today. We will reduce your weekly regimen to 2mg daily. We will follow up on Thursday.  If any questions arise do not hesitate to call us at [phone] M-F from 8:30am-4:30pm or at [phone] after 5pm or on the weekends. Continue to monitor for any excess bleeding or bruising, especially for blood in your stool.

## 2023-12-05 ENCOUNTER — HOSPITAL ENCOUNTER (OUTPATIENT)
Dept: CARDIOLOGY | Facility: HOSPITAL | Age: 68
Discharge: HOME | End: 2023-12-05
Payer: MEDICARE

## 2023-12-05 ENCOUNTER — LAB (OUTPATIENT)
Dept: LAB | Facility: LAB | Age: 68
End: 2023-12-05
Payer: MEDICARE

## 2023-12-05 DIAGNOSIS — I42.9 CARDIOMYOPATHY, UNSPECIFIED TYPE (MULTI): Primary | ICD-10-CM

## 2023-12-05 DIAGNOSIS — N28.9 ACUTE ON CHRONIC RENAL INSUFFICIENCY: ICD-10-CM

## 2023-12-05 DIAGNOSIS — J44.9 CHRONIC OBSTRUCTIVE PULMONARY DISEASE, UNSPECIFIED (MULTI): Primary | ICD-10-CM

## 2023-12-05 DIAGNOSIS — I50.42 CHRONIC COMBINED SYSTOLIC AND DIASTOLIC HEART FAILURE, NYHA CLASS 3 (MULTI): ICD-10-CM

## 2023-12-05 DIAGNOSIS — I50.22 CHRONIC SYSTOLIC HEART FAILURE (MULTI): ICD-10-CM

## 2023-12-05 DIAGNOSIS — Z95.810 PRESENCE OF AUTOMATIC (IMPLANTABLE) CARDIAC DEFIBRILLATOR: ICD-10-CM

## 2023-12-05 DIAGNOSIS — N18.9 ACUTE ON CHRONIC RENAL INSUFFICIENCY: ICD-10-CM

## 2023-12-05 PROCEDURE — 87075 CULTR BACTERIA EXCEPT BLOOD: CPT

## 2023-12-05 PROCEDURE — 87205 SMEAR GRAM STAIN: CPT

## 2023-12-05 PROCEDURE — 87070 CULTURE OTHR SPECIMN AEROBIC: CPT

## 2023-12-05 PROCEDURE — 87186 SC STD MICRODIL/AGAR DIL: CPT

## 2023-12-05 PROCEDURE — 36415 COLL VENOUS BLD VENIPUNCTURE: CPT

## 2023-12-07 ENCOUNTER — TELEPHONE (OUTPATIENT)
Dept: CARDIOLOGY | Facility: HOSPITAL | Age: 68
End: 2023-12-07

## 2023-12-07 ENCOUNTER — ANTICOAGULATION - WARFARIN VISIT (OUTPATIENT)
Dept: PHARMACY | Facility: HOSPITAL | Age: 68
End: 2023-12-07
Payer: MEDICARE

## 2023-12-07 DIAGNOSIS — Z95.2 H/O MECHANICAL AORTIC VALVE REPLACEMENT: ICD-10-CM

## 2023-12-07 DIAGNOSIS — I48.0 PAROXYSMAL ATRIAL FIBRILLATION (MULTI): Primary | ICD-10-CM

## 2023-12-07 LAB
INR IN PPP BY COAGULATION ASSAY EXTERNAL: 2 (ref 2.5–3.5)
PROTHROMBIN TIME (PT) IN PPP BY COAGULATION ASSAY EXTERNAL: ABNORMAL SECONDS

## 2023-12-07 NOTE — PATIENT INSTRUCTIONS
Your INR is slightly low today at 2.0. Please boost today with 3mg and then continue 2mg daily. We will follow up on Monday.      If any questions arise do not hesitate to call us at [phone] M-F from 8:30am-4:30pm or at [phone] after 5pm or on the weekends. Continue to monitor for any excess bleeding or bruising, especially for blood in your stool.

## 2023-12-07 NOTE — PROGRESS NOTES
I received a fax from [employer] on 12/7/2023 with an INR of 2.0. I spoke with Mrs. Oliveira and she stated Mr. Oliveira has been eating very poorly these past few days. She mentioned he is eating a little more than he was on Monday. They started him on doxycycline for 7 days and vitamin C. He had his first dose of doxycycline last night. No signs or symptoms of bleeding. Given his INR is slightly low but he has started both the doxycycline and vitamin C, we will boost today with 3mg and then continue 2mg daily. We will follow up on Monday.

## 2023-12-07 NOTE — PROGRESS NOTES
11/30/23 Labs reviewed.  BUN/Cr are elevated 51/3.0.  Patient is not on any diuretics/MRA/or ACEIARNI/ARB due to intolerance.  He is bed bound at home and has refused to come into hospital.  He has a DNR order and is shortly going to hospice.

## 2023-12-08 LAB
BACTERIA SPEC RESP CULT: ABNORMAL
GRAM STN SPEC: ABNORMAL
GRAM STN SPEC: ABNORMAL

## 2023-12-11 ENCOUNTER — ANTICOAGULATION - WARFARIN VISIT (OUTPATIENT)
Dept: PHARMACY | Facility: HOSPITAL | Age: 68
End: 2023-12-11
Payer: MEDICARE

## 2023-12-11 DIAGNOSIS — Z95.2 H/O MECHANICAL AORTIC VALVE REPLACEMENT: ICD-10-CM

## 2023-12-11 DIAGNOSIS — I48.0 PAROXYSMAL ATRIAL FIBRILLATION (MULTI): Primary | ICD-10-CM

## 2023-12-11 LAB
INR IN PPP BY COAGULATION ASSAY EXTERNAL: 3 (ref 2.5–3.5)
PROTHROMBIN TIME (PT) IN PPP BY COAGULATION ASSAY EXTERNAL: NORMAL SECONDS

## 2023-12-11 NOTE — PATIENT INSTRUCTIONS
Your INR is in range at 3.0. Please continue 2mg daily. We will follow up on Friday.    If any questions arise do not hesitate to call us at [phone] M-F from 8:30am-4:30pm or at [phone] after 5pm or on the weekends. Continue to monitor for any excess bleeding or bruising, especially for blood in your stool.

## 2023-12-11 NOTE — PROGRESS NOTES
I received a fax from [employer] on 12/11/23 with an INR of 3.0. I spoke with Mrs. Oliveira and she stated Mr. Oliveira has still been eating very poorly these past few days. She stated some days are better than others but the acid reflux really bothers him. She did state he is on pantoprazole. Tomorrow is his last day of the doxycycline. He is taking vitamin C. No signs or symptoms of bleeding. Given his INR is in range today and he has his last dose of doxycycline tomorrow we will continue him on 2mg daily. We will follow up on Friday.

## 2023-12-20 LAB
INR IN PPP BY COAGULATION ASSAY EXTERNAL: 8 (ref 2.5–3.5)
PROTHROMBIN TIME (PT) IN PPP BY COAGULATION ASSAY EXTERNAL: ABNORMAL SECONDS

## 2023-12-21 ENCOUNTER — ANTICOAGULATION - WARFARIN VISIT (OUTPATIENT)
Dept: PHARMACY | Facility: HOSPITAL | Age: 68
End: 2023-12-21
Payer: MEDICARE

## 2023-12-21 DIAGNOSIS — I48.0 PAROXYSMAL ATRIAL FIBRILLATION (MULTI): Primary | ICD-10-CM

## 2023-12-21 DIAGNOSIS — Z95.2 H/O MECHANICAL AORTIC VALVE REPLACEMENT: ICD-10-CM

## 2023-12-21 NOTE — PATIENT INSTRUCTIONS
Your INR is high at 8.0. Since the dose was held yesterday please hold again today. We will retest on Friday. Please try and incorporate greens tonight.  If any questions arise do not hesitate to call us at [phone] M-F from 8:30am-4:30pm or at [phone] after 5pm or on the weekends. Continue to monitor for any excess bleeding or bruising, especially for blood in your stool.

## 2023-12-21 NOTE — PROGRESS NOTES
I received a phone call from Mrs. Oliveira on 12.21 reporting an INR of 8 from 12.20.23. I spoke with Mrs. Oliveira and she stated Mr. Oliveira has still been eating very poorly these past few days. She stated his eating has declined further. No signs or symptoms of bleeding. Occasionally his sore ulcer has a little blood. She stated she held his dose last night given his INR was so high. Given his INR is high we will hold today and retest tomorrow. His INR was previously fairly stable on 2mg daily. She plans on trying to get him to eat some greens tonight. Given appetite has declined further we will need to adjust his weekly regimen once we get the INR back in range. We have reach out to Dr. aJckson to make him aware of the plan.

## 2023-12-22 ENCOUNTER — ANTICOAGULATION - WARFARIN VISIT (OUTPATIENT)
Dept: PHARMACY | Facility: HOSPITAL | Age: 68
End: 2023-12-22
Payer: MEDICARE

## 2023-12-22 DIAGNOSIS — Z95.2 H/O MECHANICAL AORTIC VALVE REPLACEMENT: ICD-10-CM

## 2023-12-22 DIAGNOSIS — I48.0 PAROXYSMAL ATRIAL FIBRILLATION (MULTI): Primary | ICD-10-CM

## 2023-12-22 LAB
INR IN PPP BY COAGULATION ASSAY EXTERNAL: 3.1 (ref 2.5–3.5)
PROTHROMBIN TIME (PT) IN PPP BY COAGULATION ASSAY EXTERNAL: NORMAL SECONDS

## 2023-12-22 NOTE — PROGRESS NOTES
I received a phone call from Mrs. Oliveira on 12.22 reporting an INR of 3.1. I spoke with Mrs. Oliveira and she stated Mr. Oliveira has still been eating very poorly. She did manage to get him to eat some broccoli.  She stated his eating has declined further. No signs or symptoms of bleeding. Occasionally his sore ulcer has a little blood but none recently. Given appetite has declined further over the past two weeks we will need to adjust his weekly regimen. Since it is back in range after a two day hold, we will adjust his weekly regimen to 1mg Sun, Tues, Fri and 2mg all other days. We will have her retest early next week.

## 2023-12-22 NOTE — PATIENT INSTRUCTIONS
Your INR is back in range at 3.1. We will adjust your weekly regimen to 1mg Sun, Tues, Fri and 2mg all other days. We will follow up next Wednesday.  If any questions arise do not hesitate to call us at [phone] M-F from 8:30am-4:30pm or at [phone] after 5pm or on the weekends. Continue to monitor for any excess bleeding or bruising, especially for blood in your stool.

## 2023-12-28 ENCOUNTER — ANTICOAGULATION - WARFARIN VISIT (OUTPATIENT)
Dept: PHARMACY | Facility: HOSPITAL | Age: 68
End: 2023-12-28
Payer: MEDICARE

## 2023-12-28 DIAGNOSIS — I48.0 PAROXYSMAL ATRIAL FIBRILLATION (MULTI): Primary | ICD-10-CM

## 2023-12-28 DIAGNOSIS — Z95.2 H/O MECHANICAL AORTIC VALVE REPLACEMENT: ICD-10-CM

## 2023-12-28 LAB
INR IN PPP BY COAGULATION ASSAY EXTERNAL: 5 (ref 2.5–3.5)
PROTHROMBIN TIME (PT) IN PPP BY COAGULATION ASSAY EXTERNAL: ABNORMAL SECONDS

## 2023-12-28 NOTE — PROGRESS NOTES
I received a fax from [employer] on 12.28.23 with an INR of 5.0. I spoke with Mrs. Oliveira and she stated Mr. Oliveira continues to eat very poorly. She did manage to get him to eat some broccoli a few times this past week. No signs or symptoms of bleeding. She mentioned his sores are improving with time.  We had adjusted his weekly regimen last week but his INR is still elevated. Given his INR is still high despite the dose decrease we will hold the warfarin tonight. He will retest tomorrow. We will reduce his regimen further at that point in time.

## 2023-12-28 NOTE — PATIENT INSTRUCTIONS
Your INR is high at 5.0. Please hold today and retest tomorrow.  If any questions arise do not hesitate to call us at [phone] M-F from 8:30am-4:30pm or at [phone] after 5pm or on the weekends. Continue to monitor for any excess bleeding or bruising, especially for blood in your stool.

## 2023-12-29 ENCOUNTER — ANTICOAGULATION - WARFARIN VISIT (OUTPATIENT)
Dept: PHARMACY | Facility: HOSPITAL | Age: 68
End: 2023-12-29
Payer: MEDICARE

## 2023-12-29 DIAGNOSIS — Z95.2 H/O MECHANICAL AORTIC VALVE REPLACEMENT: ICD-10-CM

## 2023-12-29 DIAGNOSIS — I48.0 PAROXYSMAL ATRIAL FIBRILLATION (MULTI): Primary | ICD-10-CM

## 2023-12-29 LAB
INR IN PPP BY COAGULATION ASSAY EXTERNAL: 6.1 (ref 2.5–3.5)
PROTHROMBIN TIME (PT) IN PPP BY COAGULATION ASSAY EXTERNAL: ABNORMAL SECONDS

## 2023-12-29 NOTE — PATIENT INSTRUCTIONS
Your INR is high at 6.1. Please hold today, tomorrow and then continue your weekly regimen of 2mg Mon, Wed, Thur, Sat and 1mg all other days.  If any questions arise do not hesitate to call us at [phone] M-F from 8:30am-4:30pm or at [phone] after 5pm or on the weekends. Continue to monitor for any excess bleeding or bruising, especially for blood in your stool.

## 2023-12-29 NOTE — PROGRESS NOTES
I received a fax from [facility] on 12.29.23 with an INR of 6.1. I spoke with Mrs. Oliveira and she stated Mr. Oliveira continues to eat very poorly. She did manage to get him to eat some broccoli yesterday. No signs or symptoms of bleeding. She mentioned his sores are improving with time.  We had adjusted his weekly regimen last week but his INR is still elevated despite holding for a day. Given his INR is still high despite the hold we will hold for another 2 days. She is going to have him eat broccoli the next few days. We discussed continuing him back on the warfarin on Sunday. We will have him retest on Monday.

## 2024-01-02 ENCOUNTER — ANTICOAGULATION - WARFARIN VISIT (OUTPATIENT)
Dept: PHARMACY | Facility: HOSPITAL | Age: 69
End: 2024-01-02
Payer: MEDICARE

## 2024-01-02 DIAGNOSIS — I48.0 PAROXYSMAL ATRIAL FIBRILLATION (MULTI): Primary | ICD-10-CM

## 2024-01-02 DIAGNOSIS — Z95.2 H/O MECHANICAL AORTIC VALVE REPLACEMENT: ICD-10-CM

## 2024-01-02 NOTE — PROGRESS NOTES
I received a fax from [employer] on 1.2.2024 with an INR of 3.0.  I spoke with Mrs. Oliveira and she stated Mr. Oliveira continues to eat very poorly. No signs or symptoms of bleeding. She stated he did eat a bit better on New Years compared to the other days. We held two days last week and since Sunday he has been on 1mg daily. Given his INR is in range at 3.0, we will have her continue 1mg daily. We will follow up on Friday.

## 2024-01-02 NOTE — PATIENT INSTRUCTIONS
Your INR is in range at 3.0. Please start talking 1mg daily. We will follow up Friday.    If any questions arise do not hesitate to call us at [phone] M-F from 8:30am-4:30pm or at [phone] after 5pm or on the weekends. Continue to monitor for any excess bleeding or bruising, especially for blood in your stool.

## 2024-01-05 ENCOUNTER — TELEPHONE (OUTPATIENT)
Dept: CARDIOLOGY | Facility: CLINIC | Age: 69
End: 2024-01-05
Payer: MEDICARE

## 2024-01-05 ENCOUNTER — TELEPHONE (OUTPATIENT)
Dept: CARDIOLOGY | Facility: HOSPITAL | Age: 69
End: 2024-01-05

## 2024-01-05 DIAGNOSIS — I48.0 PAROXYSMAL ATRIAL FIBRILLATION (MULTI): Primary | ICD-10-CM

## 2024-01-05 RX ORDER — AMIODARONE HYDROCHLORIDE 200 MG/1
200 TABLET ORAL DAILY
Qty: 30 TABLET | Refills: 0 | Status: SHIPPED | OUTPATIENT
Start: 2024-01-05 | End: 2024-02-05 | Stop reason: SDUPTHER

## 2024-01-05 RX ORDER — AMIODARONE HYDROCHLORIDE 200 MG/1
200 TABLET ORAL DAILY
COMMUNITY
End: 2024-01-05 | Stop reason: SDUPTHER

## 2024-01-05 NOTE — TELEPHONE ENCOUNTER
The patient's spouse called with concern that the patient is out of amiodarone and is requesting a refill. The patient is overdue for a clinic visit with EP. She states the patient is homebound and requests that the follow up appointment be virtual. A 30 day supply was sent to the patient's local pharmacy and a virtual visit scheduled.

## 2024-01-06 ENCOUNTER — ANTICOAGULATION - WARFARIN VISIT (OUTPATIENT)
Dept: PHARMACY | Facility: HOSPITAL | Age: 69
End: 2024-01-06
Payer: MEDICARE

## 2024-01-06 DIAGNOSIS — Z95.2 H/O MECHANICAL AORTIC VALVE REPLACEMENT: ICD-10-CM

## 2024-01-06 DIAGNOSIS — I48.0 PAROXYSMAL ATRIAL FIBRILLATION (MULTI): Primary | ICD-10-CM

## 2024-01-06 LAB
INR IN PPP BY COAGULATION ASSAY EXTERNAL: 3.74
PROTHROMBIN TIME (PT) IN PPP BY COAGULATION ASSAY EXTERNAL: NORMAL SECONDS

## 2024-01-06 NOTE — PROGRESS NOTES
I received a call from patient's wife Simran with an INR of 3.7.  I spoke with Mrs. Oliveira and she stated Mr. Oliveira continues to eat very poorly. No signs or symptoms of bleeding. Since today his INR is high at 3.7 we will hold today and continue 1mg Sun, Wed, Fri and 2mg all other days. We will follow up on Monday.

## 2024-01-06 NOTE — PATIENT INSTRUCTIONS
Your INR is high at 3.7. Please hold today only and resume 1mg Sun, Tues, and Friday, and 2mg all other days. We will follow up Monday.    If any questions arise do not hesitate to call us at [phone] M-F from 8:30am-4:30pm or at [phone] after 5pm or on the weekends. Continue to monitor for any excess bleeding or bruising, especially for blood in your stool.

## 2024-01-08 ENCOUNTER — TELEPHONE (OUTPATIENT)
Dept: PHARMACY | Facility: HOSPITAL | Age: 69
End: 2024-01-08
Payer: MEDICARE

## 2024-01-08 DIAGNOSIS — I48.0 PAROXYSMAL ATRIAL FIBRILLATION (MULTI): ICD-10-CM

## 2024-01-08 DIAGNOSIS — Z95.2 H/O MECHANICAL AORTIC VALVE REPLACEMENT: Primary | ICD-10-CM

## 2024-01-08 RX ORDER — WARFARIN 1 MG/1
TABLET ORAL
Qty: 26 TABLET | Refills: 2 | Status: SHIPPED | OUTPATIENT
Start: 2024-01-08 | End: 2024-03-05

## 2024-01-08 NOTE — TELEPHONE ENCOUNTER
I spoke with Mrs. Oliveira on Monday. We discussed ordering 1mg tablets so she is not having to split the 2mg tablets. A prescription was sent to the pharmacy. She has been giving him 1mg daily since last Sunday with a hold on Saturday due to his INR being slightly supratherapeutic. We will have her adjust his weekly regimen to 0.5mg on Wednesday and 1mg all other days. We will follow up on Friday.

## 2024-01-12 ENCOUNTER — ANTICOAGULATION - WARFARIN VISIT (OUTPATIENT)
Dept: PHARMACY | Facility: HOSPITAL | Age: 69
End: 2024-01-12
Payer: MEDICARE

## 2024-01-12 DIAGNOSIS — Z95.2 H/O MECHANICAL AORTIC VALVE REPLACEMENT: ICD-10-CM

## 2024-01-12 DIAGNOSIS — I48.0 PAROXYSMAL ATRIAL FIBRILLATION (MULTI): Primary | ICD-10-CM

## 2024-01-12 LAB
INR IN PPP BY COAGULATION ASSAY EXTERNAL: 3.6 (ref 2.5–3.5)
PROTHROMBIN TIME (PT) IN PPP BY COAGULATION ASSAY EXTERNAL: ABNORMAL SECONDS

## 2024-01-12 NOTE — PROGRESS NOTES
I received a fax from [facility] on 1.12.24 with an INR of 3.6. I spoke with Mrs. Oliveira and she stated she gave him 0.5mg on Monday, Tuesday and Wednesday. She said they started him on an increased dose of synthroid, and added on allopurinol and torsemide. We discussed how these can impact INR. She mentioned he is eating about the same and did not have greens this past week. No signs or symptoms of bleeding. Given his INR is slightly high and we are adding on agents that do affect INR we will decrease his weekly regimen to 1mg Tues, Thurs, Sun and 0.5mg all other days. We will follow up in a week. We discussed it would be okay if she would like to start giving him a little spinach every week.

## 2024-01-12 NOTE — PATIENT INSTRUCTIONS
Your INR is high at 3.6. Please decrease your weekly regimen to 1mg Sun, Tues, and Thursday and 0.5mg all other days. We will follow up Monday.    If any questions arise do not hesitate to call us at [phone] M-F from 8:30am-4:30pm or at [phone] after 5pm or on the weekends. Continue to monitor for any excess bleeding or bruising, especially for blood in your stool.

## 2024-01-23 LAB
INR IN PPP BY COAGULATION ASSAY EXTERNAL: 3.03 (ref 2.5–3.5)
PROTHROMBIN TIME (PT) IN PPP BY COAGULATION ASSAY EXTERNAL: NORMAL SECONDS

## 2024-01-24 ENCOUNTER — ANTICOAGULATION - WARFARIN VISIT (OUTPATIENT)
Dept: PHARMACY | Facility: HOSPITAL | Age: 69
End: 2024-01-24
Payer: MEDICARE

## 2024-01-24 DIAGNOSIS — Z95.2 H/O MECHANICAL AORTIC VALVE REPLACEMENT: ICD-10-CM

## 2024-01-24 DIAGNOSIS — I48.0 PAROXYSMAL ATRIAL FIBRILLATION (MULTI): Primary | ICD-10-CM

## 2024-01-24 NOTE — PROGRESS NOTES
I received a phone call from Mrs. Oliveira on 1.23.24 with an INR of 3.03. She stated [facility] went out on 1.22.24 for blood work. She stated Mr. Oliveira has been off of the torsemide since Friday. No other changes in medications or diet. No signs or symptoms of bleeding. His appetite has remained poor. He has been taking 0.5mg daily since 1.15.24. Given his INR is in range at 3.03 and he has been taking the 0.5mg daily, we will continue him on 0.5mg daily. We will follow up on Monday.

## 2024-01-24 NOTE — PATIENT INSTRUCTIONS
Your INR is in range at 3.03. Please continue 0.5mg daily. We will follow up next week.    If any questions arise do not hesitate to call us at [phone] M-F from 8:30am-4:30pm or at [phone] after 5pm or on the weekends. Continue to monitor for any excess bleeding or bruising, especially for blood in your stool.

## 2024-01-26 ENCOUNTER — ANTICOAGULATION - WARFARIN VISIT (OUTPATIENT)
Dept: PHARMACY | Facility: HOSPITAL | Age: 69
End: 2024-01-26
Payer: MEDICARE

## 2024-01-26 DIAGNOSIS — I48.0 PAROXYSMAL ATRIAL FIBRILLATION (MULTI): Primary | ICD-10-CM

## 2024-01-26 DIAGNOSIS — Z95.2 H/O MECHANICAL AORTIC VALVE REPLACEMENT: ICD-10-CM

## 2024-01-26 LAB
INR IN PPP BY COAGULATION ASSAY EXTERNAL: 1.9 (ref 2.5–3.5)
PROTHROMBIN TIME (PT) IN PPP BY COAGULATION ASSAY EXTERNAL: ABNORMAL SECONDS

## 2024-01-26 NOTE — PATIENT INSTRUCTIONS
Your INR is low at 1.9. Please boost today with 1mg and then continue 0.5mg daily. We will follow up next week.    If any questions arise do not hesitate to call us at [phone] M-F from 8:30am-4:30pm or at [phone] after 5pm or on the weekends. Continue to monitor for any excess bleeding or bruising, especially for blood in your stool.

## 2024-01-26 NOTE — PROGRESS NOTES
I received a fax from [facility] on 1.25.24 with an INR of 1.9. Mr. Oliveira has been off of the torsemide since last Friday. No other changes in medications or diet. No signs or symptoms of bleeding. He does have some bruising where he took off his bandaid from his CPAP. We discussed ensuring the area does not worsen with time. His appetite has remained poor. He has been taking 0.5mg daily since 1.15.24. Given his INR is low at 1.9, we will boost today with 1mg and then continue him on 0.5mg daily. We discussed his INR might be dropping this week due to him being off of the torsemide. We will follow up on Monday to see what adjustments we need to make in his regimen.

## 2024-01-29 ENCOUNTER — ANTICOAGULATION - WARFARIN VISIT (OUTPATIENT)
Dept: PHARMACY | Facility: HOSPITAL | Age: 69
End: 2024-01-29
Payer: MEDICARE

## 2024-01-29 DIAGNOSIS — Z95.2 H/O MECHANICAL AORTIC VALVE REPLACEMENT: ICD-10-CM

## 2024-01-29 DIAGNOSIS — I48.0 PAROXYSMAL ATRIAL FIBRILLATION (MULTI): Primary | ICD-10-CM

## 2024-01-29 LAB
INR IN PPP BY COAGULATION ASSAY EXTERNAL: 2.5 (ref 2.5–3.5)
PROTHROMBIN TIME (PT) IN PPP BY COAGULATION ASSAY EXTERNAL: NORMAL SECONDS

## 2024-01-29 NOTE — PROGRESS NOTES
I received a fax from [facility] on 1.29.24 with an INR of 2.5. No other changes in medications or diet. No signs or symptoms of bleeding. His appetite has remained poor. He has been taking 0.5mg daily since 1.15.24 with a boost last Friday of 1mg. Given his INR is in range at 2.5, we will adjust his weekly regimen to 1mg on Wednesday and 0.5mg all other days. We will follow up on Friday.

## 2024-01-29 NOTE — PATIENT INSTRUCTIONS
Your INR is in range at 2.5. Please start taking 1mg on Wednesday and 0.5mg all other days. We will follow up on Friday.    If any questions arise do not hesitate to call us at [phone] M-F from 8:30am-4:30pm or at [phone] after 5pm or on the weekends. Continue to monitor for any excess bleeding or bruising, especially for blood in your stool.

## 2024-02-05 ENCOUNTER — TELEMEDICINE (OUTPATIENT)
Dept: CARDIOLOGY | Facility: CLINIC | Age: 69
End: 2024-02-05
Payer: MEDICARE

## 2024-02-05 ENCOUNTER — ANTICOAGULATION - WARFARIN VISIT (OUTPATIENT)
Dept: PHARMACY | Facility: HOSPITAL | Age: 69
End: 2024-02-05

## 2024-02-05 DIAGNOSIS — Z95.810 CARDIAC DEFIBRILLATOR IN PLACE: ICD-10-CM

## 2024-02-05 DIAGNOSIS — I48.0 PAROXYSMAL ATRIAL FIBRILLATION (MULTI): ICD-10-CM

## 2024-02-05 DIAGNOSIS — I49.3 PVC (PREMATURE VENTRICULAR CONTRACTION): Primary | ICD-10-CM

## 2024-02-05 PROCEDURE — 99214 OFFICE O/P EST MOD 30 MIN: CPT | Performed by: INTERNAL MEDICINE

## 2024-02-05 PROCEDURE — 1159F MED LIST DOCD IN RCRD: CPT | Performed by: INTERNAL MEDICINE

## 2024-02-05 PROCEDURE — 1126F AMNT PAIN NOTED NONE PRSNT: CPT | Performed by: INTERNAL MEDICINE

## 2024-02-05 PROCEDURE — 1036F TOBACCO NON-USER: CPT | Performed by: INTERNAL MEDICINE

## 2024-02-05 RX ORDER — AMIODARONE HYDROCHLORIDE 200 MG/1
200 TABLET ORAL DAILY
Qty: 90 TABLET | Refills: 3 | Status: SHIPPED | OUTPATIENT
Start: 2024-02-05 | End: 2024-03-05

## 2024-02-05 RX ORDER — LEVOTHYROXINE SODIUM 50 UG/1
50 TABLET ORAL
COMMUNITY

## 2024-02-05 RX ORDER — ALLOPURINOL 100 MG/1
100 TABLET ORAL DAILY
COMMUNITY

## 2024-02-05 ASSESSMENT — ENCOUNTER SYMPTOMS
EXCESSIVE APPETITE: 0
ORTHOPNEA: 1
SHORTNESS OF BREATH: 1
NEAR-SYNCOPE: 0
IRREGULAR HEARTBEAT: 0
PND: 0
FALLS: 0
DYSPNEA ON EXERTION: 1
DIAPHORESIS: 0
CLAUDICATION: 0
DIARRHEA: 0
PALPITATIONS: 0
SYNCOPE: 0

## 2024-02-05 NOTE — PROGRESS NOTES
I received a fax from [facility] on 2.5.24 with an INR of 2.9. No other changes in medications or diet. No signs or symptoms of bleeding. His appetite has remained poor but sometimes improves according to this wife. His weekly regimen was increased last week to 1mg Wednesdays and 0.5mg all other days. Given his INR is in range at 2.9, we will continue his weekly regimen of 1mg on Wednesdays and 0.5mg all other days. We will follow up on Monday

## 2024-02-05 NOTE — PATIENT INSTRUCTIONS
Your INR is in range at 2.9. Please continue taking 1mg on Wednesday and 0.5mg all other days. We will follow up on Friday. If any questions arise do not hesitate to call us at [phone] M-F from 8:30am-4:30pm or at [phone] after 5pm or on the weekends. Continue to monitor for any excess bleeding or bruising, especially for blood in your stool.

## 2024-02-05 NOTE — PROGRESS NOTES
Electrophysiology Follow up Visit    Virtual Consent    An interactive audio and video telecommunication system which permits real time communications between the patient (at the originating site) and provider (at the distant site) was utilized to provide this telehealth service.     Verbal consent was requested and obtained from Zeus Oliveira on this date, 02/05/24 for a telehealth visit.      Zeus Oliveira is a 68 y.o. year old male patient with    1. Bicuspid AoV s/p mechanical AVR.      2. NICM- diagnosed with severe LV dysfunction. He had minimal CAD on [facility], he was started on Amiodarone for AF and since then has remained in sinus rhythm. He was also started on OMT since then. F/u echo in August 2019 with EF at 30%. Patient underwent CRTD implantation September 2019. LV lead placed in posterolateral LV wall via MCV. Patient has felt better SOB wise since implant but not a dramatic improvement. He was on amiodarone 200mg bid for AF and PVC suppression. F/u echo with EF 35%.     3. Atrial fibrillation- no recurrence noted on device check     4. PVCs- Patient with frequent PVCs which had been controlled with amiodarone. Amiodarone was stopped and digoxin was started though PVCs recurred. Device check March 2022 showed frequent PVCs with brief NSVT. Patient was loaded with amiodarone and taking 200mg daily now and palpitations have resolved.     This is a virtual visit for AF, PVC, and amiodarone monitoring. Patient reports he was hospitalized last spring and then discharged to rehab. He eventually returned home in May. Since that time he had worsening renal function and multiple adjustments in his diuretics. He is currently using 6L supplemental oxygen and remains mainly bedridden with home health care visits. He reports he has not been out of the house since May.     He denies palpitations or any feelings of atrial fibrillation or PVCs. He denies syncope or near syncope.      Medical History  He has a past  medical history of Personal history of other diseases of the nervous system and sense organs (09/14/2022) and Personal history of other malignant neoplasm of kidney (09/14/2022).    Social History  He reports that he has never smoked. He has never used smokeless tobacco. He reports that he does not currently use alcohol. He reports that he does not use drugs.      FamHx:   Family History   Problem Relation Name Age of Onset    Heart failure Father         Allergies   Allergen Reactions    Amoxicillin Angioedema, Itching and Swelling    Atorvastatin Other and Myalgia     Muscle weakness    Digoxin Other     Heart was jumping out of chest         Outpatient Medications:  Current Outpatient Medications   Medication Instructions    acetaminophen (TYLENOL) 500 mg, oral, Every 6 hours PRN    albuterol 2.5 mg, inhalation    allopurinol (ZYLOPRIM) 100 mg, oral, Daily    amiodarone (PACERONE) 200 mg, oral, Daily    ascorbic acid (VITAMIN C) 125 mg, oral, 3 times daily    carvedilol (COREG) 6.25 mg, oral, 2 times daily after meals    clotrimazole-betamethasone (Lotrisone) cream Topical, 2 times daily    coenzyme Q-10 100 mg, oral    ezetimibe (ZETIA) 10 mg, oral, Daily    fluticasone (Flonase) 50 mcg/actuation nasal spray 1 spray, Each Nostril, 2 times daily    fluticasone-umeclidin-vilanter (Trelegy Ellipta) 100-62.5-25 mcg blister with device 1 puff, inhalation, Daily, 90 day supply with 3 refills    ipratropium-albuteroL (Duo-Neb) 0.5-2.5 mg/3 mL nebulizer solution 3 mL, inhalation, 4 times daily    levothyroxine (SYNTHROID, LEVOXYL) 50 mcg, oral, Daily before breakfast    loratadine (CLARITIN) 10 mg, oral, Daily    magnesium gluconate (Magonate) 27.5 mg magne- sium (500 mg) tablet 27.5 mg, oral, 2 times daily    melatonin 3 mg, oral    metFORMIN (GLUCOPHAGE) 500 mg, oral, 2 times daily with meals    mv-mn-iron-FA-herbal cmplx#190 (Vitamin D3 Complete) 18 mg iron-800 mcg-150 mg tablet 1 tablet, oral, Daily    pantoprazole  "(PROTONIX) 40 mg, oral, Daily    sertraline (ZOLOFT) 50 mg, oral, Daily    warfarin (Coumadin) 1 mg tablet Take 1 tablet by mouth daily except take 0.5mg on Wednesday or as directed by [facility]         Last Recorded Vitals: .vital      10/18/2022    10:24 AM 12/2/2022     9:42 AM 12/6/2022    10:49 AM 12/9/2022    10:21 AM 1/5/2023    11:39 AM 2/8/2023    10:31 AM 2/13/2023    10:31 AM   Vitals   Systolic 101 111 110 114 128 108 105   Diastolic 59 71 58 80 76 60 67   Heart Rate 65 67 64 68 80 64 80   Temp  37 °C (98.6 °F) 30.8 °C (87.5 °F)       Resp 18 16  18  20    Height (in)     1.753 m (5' 9\")     Weight (lb) 246.7  260 261.8 260 265.2 264.7   BMI 36.43 kg/m2  38.4 kg/m2 38.66 kg/m2 38.4 kg/m2 39.16 kg/m2 39.09 kg/m2   BSA (m2) 2.34 m2  2.4 m2 2.41 m2 2.4 m2 2.42 m2 2.42 m2    Visit Vitals  Smoking Status Never      Review of Systems   Constitutional: Positive for malaise/fatigue. Negative for diaphoresis.   HENT:  Negative for congestion.    Cardiovascular:  Positive for dyspnea on exertion, leg swelling and orthopnea. Negative for chest pain, claudication, cyanosis, irregular heartbeat, near-syncope, palpitations, paroxysmal nocturnal dyspnea and syncope.   Respiratory:  Positive for shortness of breath.    Musculoskeletal:  Negative for falls and muscle weakness.   Gastrointestinal:  Negative for diarrhea, dysphagia and excessive appetite.        Cardiac Testing Reviewed Study(s):  Echo(December/2022):   CONCLUSIONS:   1. Left ventricular systolic function is severely decreased with a 20% estimated ejection fraction.   2. Spectral Doppler shows an abnormal pattern of left ventricular diastolic filling.   3. There is moderately reduced right ventricular systolic function.   4. The left atrium is moderate to severely dilated.   5. The right atrium is mild to moderately dilated.   6. Moderately elevated right ventricular systolic pressure.   7. Mechanical aortic valve with a mean aortic valve gradient of " 18 mmHg.   8. There is global hypokinesis of the left ventricle with minor regional variations.    Lab Results   Component Value Date    WBC 14.2 (H) 04/12/2023    HGB 11.4 (L) 04/12/2023    HCT 35.6 (L) 04/12/2023     04/12/2023    ALT 16 03/31/2023    AST 27 03/31/2023     (L) 04/12/2023    K 3.9 04/12/2023    CL 94 (L) 04/12/2023    CREATININE 2.10 (H) 04/12/2023    BUN 77 (H) 04/12/2023    CO2 30 04/12/2023    TSH 4.09 (H) 12/09/2022    INR 2.50 01/29/2024       Assessment  Atrial fibrillation: Review of recent device check from December showed no recurrent AF/AT. Labs reviewed and TSH was addressed by primary team. We will continue amiodarone 200mg daily. Patient reports he had an ECG done a couple of months ago. We will try to locate that ECG, if not we order ECG to be done by home care.  PVCs: PVC burden remains low on recent device check with >99% BiV pacing. We will continue amiodarone as stated above.   CRT-D: BiV pacing >99%. VT/VF detection and therapies turned off as patient's health status is deteriorating over this past year.     PLAN  Continue amiodarone 200mg daily  We locate ECG and if unable to locate, we will order ECG to assess Qtc  Follow up in 6 months        Exclusive of any other services or procedures performed, Haylee ESPINOZA APRN-CNP , spent 30 minutes in duration for this visit today.  This time consisted of chart review, obtaining history, and/or performing the exam as documented above as well as documenting the clinical information for the encounter in the electronic record, discussing treatment options, plans, and/or goals with patient, family, and/or caregiver, refilling medications, updating the electronic record, ordering medicines, lab work, imaging, referrals, and/or procedures as documented above and communicating with other [facility]. I have discussed the results of laboratory, radiology, and cardiology studies with the patient and their  family/caregiver.

## 2024-02-12 ENCOUNTER — ANTICOAGULATION - WARFARIN VISIT (OUTPATIENT)
Dept: PHARMACY | Facility: HOSPITAL | Age: 69
End: 2024-02-12
Payer: MEDICARE

## 2024-02-12 DIAGNOSIS — I48.0 PAROXYSMAL ATRIAL FIBRILLATION (MULTI): Primary | ICD-10-CM

## 2024-02-12 DIAGNOSIS — Z95.2 H/O MECHANICAL AORTIC VALVE REPLACEMENT: ICD-10-CM

## 2024-02-12 LAB
INR IN PPP BY COAGULATION ASSAY EXTERNAL: 3.8 (ref 2.5–3.5)
PROTHROMBIN TIME (PT) IN PPP BY COAGULATION ASSAY EXTERNAL: ABNORMAL SECONDS

## 2024-02-12 NOTE — PATIENT INSTRUCTIONS
Your INR is slightly high at 3.8. Please start taking 0.5 mg daily. We will follow up next Monday. Please eat your greens tonight.       If any questions arise do not hesitate to call us at [phone] M-F from 8:30am-4:30pm or at [phone] after 5pm or on the weekends. Continue to monitor for any excess bleeding or bruising, especially for blood in your stool.

## 2024-02-12 NOTE — PROGRESS NOTES
I received a fax from [facility] on 2.12.24 with an INR of 3.8. No signs or symptoms of bleeding. His appetite has remained poor but sometimes improves according to this wife. She stated the past few days it has been very poor. His weekly regimen was increased a few weeks ago to 1mg Wednesdays and 0.5mg all other days. He was doing well on this regimen until the further decline in appetite. He was also started on torsemide once daily due to fluid accumulation.  Given his INR is high at 3.8 and he has started torsemide daily, we will adjust his weekly regimen to 0.5mg daily. We will follow up on Monday of next week.

## 2024-02-16 ENCOUNTER — ANTICOAGULATION - WARFARIN VISIT (OUTPATIENT)
Dept: PHARMACY | Facility: HOSPITAL | Age: 69
End: 2024-02-16
Payer: MEDICARE

## 2024-02-16 DIAGNOSIS — I48.0 PAROXYSMAL ATRIAL FIBRILLATION (MULTI): Primary | ICD-10-CM

## 2024-02-16 DIAGNOSIS — Z95.2 H/O MECHANICAL AORTIC VALVE REPLACEMENT: ICD-10-CM

## 2024-02-16 LAB
INR IN PPP BY COAGULATION ASSAY EXTERNAL: 2 (ref 2–3)
PROTHROMBIN TIME (PT) IN PPP BY COAGULATION ASSAY EXTERNAL: NORMAL SECONDS

## 2024-02-16 NOTE — PROGRESS NOTES
I received a fax from [facility] on 2.16.24 with an INR of 2.0. No signs or symptoms of bleeding. His appetite has remained poor but sometimes improves according to this wife. She stated the past few days it has been very poor. He is on the torsemide. He started to drink Premier protein drinks. He typically drinks 1-2 per day. He started these on Tuesday. These drinks do contain vitamin K.  Given his INR is low at 2.0, we will increase his weekly regimen to 1mg M,W,F and 0.5mg all other days to account for the vitamin K in the drinks he has started. We will follow up on Tuesday of next week.

## 2024-02-16 NOTE — PATIENT INSTRUCTIONS
Your INR is slightly low at 2.0. Please start taking 1mg M,W,F and 0.5mg all other days. We will follow up next Tuesday.     If any questions arise do not hesitate to call us at [phone] M-F from 8:30am-4:30pm or at [phone] after 5pm or on the weekends. Continue to monitor for any excess bleeding or bruising, especially for blood in your stool.

## 2024-02-21 ENCOUNTER — ANTICOAGULATION - WARFARIN VISIT (OUTPATIENT)
Dept: PHARMACY | Facility: HOSPITAL | Age: 69
End: 2024-02-21
Payer: MEDICARE

## 2024-02-21 DIAGNOSIS — I48.0 PAROXYSMAL ATRIAL FIBRILLATION (MULTI): Primary | ICD-10-CM

## 2024-02-21 DIAGNOSIS — Z95.2 H/O MECHANICAL AORTIC VALVE REPLACEMENT: ICD-10-CM

## 2024-02-21 LAB
INR IN PPP BY COAGULATION ASSAY EXTERNAL: 1.4 (ref 2.5–3.5)
PROTHROMBIN TIME (PT) IN PPP BY COAGULATION ASSAY EXTERNAL: ABNORMAL SECONDS

## 2024-02-21 NOTE — PROGRESS NOTES
I received a fax from [facility] on 2.21.24 with an INR of 1.4. No signs or symptoms of bleeding or clotting. His appetite has remained poor. She did state he has been drinking 1-1.5 Premier drinks a day. Sometimes he does drink 2. We discussed to monitor how many he is drinking in a day given his vitamin K intake has been changing based on him drinking those. He is still taking the torsemide. Given his INR is low at 1.4, we will boost today with 3mg and then increase his weekly regimen to 0.5mg Tues, Thurs and 1mg all other days. I discussed with his wife given last week he was only doing mainly 1 drink a day and now is doing more it explains why his INR decreased despite us increasing his weekly regimen. We will start lovenox injections twice daily to bridge him and then follow up tomorrow.

## 2024-02-21 NOTE — PATIENT INSTRUCTIONS
Your INR is low at 1.4. Please start the lovenox injections twice daily. Take 3mg of warfarin tonight and we will increase your weekly regimen to 0.5mg Tues, Thurs and 1mg all other days. Follow up tomorrow.     If any questions arise do not hesitate to call us at [phone] M-F from 8:30am-4:30pm or at [phone] after 5pm or on the weekends. Continue to monitor for any excess bleeding or bruising, especially for blood in your stool.

## 2024-02-22 ENCOUNTER — ANTICOAGULATION - WARFARIN VISIT (OUTPATIENT)
Dept: PHARMACY | Facility: HOSPITAL | Age: 69
End: 2024-02-22
Payer: MEDICARE

## 2024-02-22 DIAGNOSIS — Z95.2 H/O MECHANICAL AORTIC VALVE REPLACEMENT: ICD-10-CM

## 2024-02-22 DIAGNOSIS — I48.0 PAROXYSMAL ATRIAL FIBRILLATION (MULTI): Primary | ICD-10-CM

## 2024-02-22 NOTE — PATIENT INSTRUCTIONS
Your INR is at 2.0. Please stop the lovenox injections twice daily. Please start your weekly regimen of 0.5mg Tues, Thurs and 1mg all other days. Follow up tomorrow.     If any questions arise do not hesitate to call us at [phone] M-F from 8:30am-4:30pm or at [phone] after 5pm or on the weekends. Continue to monitor for any excess bleeding or bruising, especially for blood in your stool.

## 2024-02-22 NOTE — PROGRESS NOTES
I received a fax from [facility] on 2.22.24 with an INR of 2.0. No signs or symptoms of bleeding or clotting. His appetite has remained poor. She did state he has been drinking 1-1.5 Premier drinks a day. Sometimes he does drink 2. We discussed to monitor how many he is drinking in a day given his vitamin K intake has been changing based on him drinking those. He is still taking the torsemide. Yesterday we gave him 3mg of warfarin given his INR was 1.4. He was bridging with the lovenox injections. Given his INR is at 2.0, we will start his increased regimen of 0.5mg Tues, Thurs and 1mg all other days. We discussed stopping the lovenox injections. She stated she was able to give him at least the 2 yesterday. He does become resistant in accepting them. We will follow up tomorrow.

## 2024-02-23 ENCOUNTER — ANTICOAGULATION - WARFARIN VISIT (OUTPATIENT)
Dept: PHARMACY | Facility: HOSPITAL | Age: 69
End: 2024-02-23
Payer: MEDICARE

## 2024-02-23 DIAGNOSIS — Z95.2 H/O MECHANICAL AORTIC VALVE REPLACEMENT: ICD-10-CM

## 2024-02-23 DIAGNOSIS — Z95.2 H/O MECHANICAL AORTIC VALVE REPLACEMENT: Primary | ICD-10-CM

## 2024-02-23 DIAGNOSIS — I48.0 PAROXYSMAL ATRIAL FIBRILLATION (MULTI): Primary | ICD-10-CM

## 2024-02-23 DIAGNOSIS — I48.0 PAROXYSMAL ATRIAL FIBRILLATION (MULTI): ICD-10-CM

## 2024-02-23 LAB
INR IN PPP BY COAGULATION ASSAY EXTERNAL: 2.2 (ref 2.5–2.5)
PROTHROMBIN TIME (PT) IN PPP BY COAGULATION ASSAY EXTERNAL: ABNORMAL SECONDS

## 2024-02-23 RX ORDER — WARFARIN 1 MG/1
TABLET ORAL
Qty: 72 TABLET | Refills: 2 | Status: SHIPPED | OUTPATIENT
Start: 2024-02-23 | End: 2024-03-05

## 2024-02-23 NOTE — PROGRESS NOTES
I received a fax from [facility] on 2.23.24 with an INR of 2.2. No signs or symptoms of bleeding or clotting. His appetite has remained poor. She did state he has been drinking 1-1.5 Premier drinks a day. She did mention yesterday he only drank 1 and doesn't think he will drink no more than another today. We discussed writing down how many he has each day to help us determine his vitamin K intake. He is still taking the torsemide. Given his INR is at 2.2, we will continue his increased regimen of 0.5mg Tues, Thurs and 1mg all other days. Follow up on Monday.

## 2024-02-23 NOTE — PATIENT INSTRUCTIONS
Your INR is at 2.2. Please continue your weekly regimen of 0.5mg Tues, Thurs and 1mg all other days. Follow up Monday.      If any questions arise do not hesitate to call us at [phone] M-F from 8:30am-4:30pm or at [phone] after 5pm or on the weekends. Continue to monitor for any excess bleeding or bruising, especially for blood in your stool.

## 2024-02-26 ENCOUNTER — ANTICOAGULATION - WARFARIN VISIT (OUTPATIENT)
Dept: PHARMACY | Facility: HOSPITAL | Age: 69
End: 2024-02-26
Payer: MEDICARE

## 2024-02-26 DIAGNOSIS — Z95.2 H/O MECHANICAL AORTIC VALVE REPLACEMENT: ICD-10-CM

## 2024-02-26 DIAGNOSIS — I48.0 PAROXYSMAL ATRIAL FIBRILLATION (MULTI): Primary | ICD-10-CM

## 2024-02-26 NOTE — PROGRESS NOTES
I received a fax from [facility] on 2.26.24 with an INR of 2.5. No signs or symptoms of bleeding or clotting. He is still taking the torsemide. No changes in medications. His appetite has remained poor. She did state he has been drinking 1-1.5 Premier drinks a day at the last appointment. She did state there were no changes in diet but did not clarify if the Premier drinks have changed. Given his INR is in range at 2.5, we will continue his weekly regimen of 0.5mg Tues, Thurs and 1mg all other days. Follow up in one week.

## 2024-02-26 NOTE — PATIENT INSTRUCTIONS
Your INR is in range today at 2.5. Please continue your weekly regimen of 0.5mg Tues, Thurs and 1mg all other days. We will follow up in 1 week.   If any questions arise do not hesitate to call us at [phone] M-F from 8:30am-4:30pm or at [phone] after 5pm or on the weekends. Continue to monitor for any excess bleeding or bruising, especially for blood in your stool.

## 2024-02-27 ENCOUNTER — HOSPITAL ENCOUNTER (OUTPATIENT)
Dept: CARDIOLOGY | Facility: HOSPITAL | Age: 69
Discharge: HOME | End: 2024-02-27
Payer: MEDICARE

## 2024-02-27 DIAGNOSIS — Z95.810 PRESENCE OF AUTOMATIC (IMPLANTABLE) CARDIAC DEFIBRILLATOR: ICD-10-CM

## 2024-02-27 PROCEDURE — 93295 DEV INTERROG REMOTE 1/2/MLT: CPT | Performed by: INTERNAL MEDICINE

## 2024-02-27 PROCEDURE — 93296 REM INTERROG EVL PM/IDS: CPT

## 2024-03-04 ENCOUNTER — ANTICOAGULATION - WARFARIN VISIT (OUTPATIENT)
Dept: PHARMACY | Facility: HOSPITAL | Age: 69
End: 2024-03-04
Payer: MEDICARE

## 2024-03-04 DIAGNOSIS — I48.0 PAROXYSMAL ATRIAL FIBRILLATION (MULTI): Primary | ICD-10-CM

## 2024-03-04 DIAGNOSIS — Z95.2 H/O MECHANICAL AORTIC VALVE REPLACEMENT: ICD-10-CM

## 2024-03-04 LAB
INR IN PPP BY COAGULATION ASSAY EXTERNAL: 3.4 (ref 2.5–3.5)
PROTHROMBIN TIME (PT) IN PPP BY COAGULATION ASSAY EXTERNAL: NORMAL SECONDS

## 2024-03-04 NOTE — PROGRESS NOTES
I received a fax from [facility] on 3.4.24 with an INR of 3.4. No signs or symptoms of bleeding or clotting. His wife stated Mr. Oliveira's health took a major decline over the past week. She stated he turned blue yesterday and then eventually got his color back in his face. She mentioned [facility] got hospice involved. They stopped all of his cardiac medications. He is still on torsemide and levothyroxine. She stated he was on a doxycycline course and is to finish it tonight. He was also given Ativan as needed. He has not been eating or drinking. They give his medications via a syringe. He no longer is drinking the Premier drinks. They held his warfarin Saturday and Sunday due to the patient refusing to take it. Mrs. Oliveira stated that hospice didn't touch the warfarin because they did not know what to do with it. After discussion with the cardiology office, the decision was left to the patient and the family if they wanted to continue the medication or not. Mrs. Oliveira stated they wanted to continue as normal for now. Given his INR is on the higher end of therapeutic range and he still has another day of the doxycycline we will hold his dose tonight, have him take 0.5mg on Tuesday and then follow up on Wednesday. I did discuss with Mrs. Oliveira she should have the conversation with hospice since all of his other medications were stopped if they thought it would be reasonable to stop the warfarin as well. We discussed the risk versus benefit. She is to call the clinic if anything changes.

## 2024-03-04 NOTE — PATIENT INSTRUCTIONS
Your INR is in range today at 3.4. Please hold today and then take 0.5mg on Tuesday. Follow up on Wednesday.     If any questions arise do not hesitate to call us at [phone] M-F from 8:30am-4:30pm or at [phone] after 5pm or on the weekends. Continue to monitor for any excess bleeding or bruising, especially for blood in your stool.

## 2024-03-05 ENCOUNTER — TELEPHONE (OUTPATIENT)
Dept: PHARMACY | Facility: HOSPITAL | Age: 69
End: 2024-03-05
Payer: MEDICARE

## 2024-03-05 NOTE — TELEPHONE ENCOUNTER
Mrs. Oliveira called this morning to let the clinic know Mr. Oliveira passed away overnight. She stated [facility] was made aware as well.

## 2024-03-07 PROBLEM — E11.9 DIABETES MELLITUS, TYPE II (MULTI): Status: RESOLVED | Noted: 2023-06-01 | Resolved: 2024-03-07

## 2024-03-07 PROBLEM — E78.5 HYPERLIPIDEMIA: Status: RESOLVED | Noted: 2023-06-01 | Resolved: 2024-03-07

## 2024-06-03 ENCOUNTER — APPOINTMENT (OUTPATIENT)
Dept: CARDIOLOGY | Facility: HOSPITAL | Age: 69
End: 2024-06-03
Payer: MEDICARE